# Patient Record
Sex: FEMALE | Race: BLACK OR AFRICAN AMERICAN | NOT HISPANIC OR LATINO | Employment: FULL TIME | URBAN - METROPOLITAN AREA
[De-identification: names, ages, dates, MRNs, and addresses within clinical notes are randomized per-mention and may not be internally consistent; named-entity substitution may affect disease eponyms.]

---
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
## 2025-01-02 NOTE — PROGRESS NOTES
Maternal Fetal Medicine Follow Up    Subjective:     Patient ID: [id number]    Chief Complaint: [facility] follow up w/us       HPI: Renetta Martinez is a 19 y.o. female  at 35w3d gestation with Estimated Date of Delivery: 25  who is here for follow-up consultation by [facility].    Patient has history of  demise.  Per previous records, patient presented with leaking of amniotic fluid that was noted to be meconium.  With continued fetal decelerations, patient had  section done.  Baby unfortunately  shortly thereafter.  The baby was small for gestational age, 5 lb at 38 weeks.  Out of caution, she had APS testing that was negative on 10/10/2024.  She had carrier screening earlier in the pregnancy was noted to be alpha thalassemia carrier.  She declined to have any testing prenatally, including testing of FOB.  She has mild anemia with H/H 10.2/32.7 on 10/23/2024.  She is on oral hematinic therapy.  She had mild fetal growth restriction noted on 2024, and is here for ongoing fetal testing.       Interval history since last [facility] visit: None.. She denies any leaking fluid, vaginal bleeding, contractions, decreased fetal movement. Denies headaches, visual disturbances, or epigastric pain.    Pregnancy complications include:   Patient Active Problem List   Diagnosis    Alpha thalassemia silent carrier    Pregnancy with poor obstetric history    History of prior pregnancy with IUGR     IUGR (intrauterine growth retardation) affecting mother, third trimester, not applicable or unspecified fetus    Anemia during pregnancy in third trimester       No changes to medical, surgical, family, social, or obstetric history.    Medications:  Current Outpatient Medications   Medication Instructions    ascorbic acid (vitamin C) (VITAMIN C) 250 mg, Oral, Every other day    aspirin (ECOTRIN) 81 mg, Oral, Daily    ferrous sulfate (IRON) 325 mg, Oral, Every other day    fluticasone propionate (FLONASE) 50  "mcg/actuation nasal spray by Each Nostril route.    folic acid (FOLVITE) 1 mg, Oral, Daily    PNV no.153/FA/om3/dha/epa/fish (PRENATAL GUMMIES ORAL) Take by mouth.       Review of Systems   12 point review of systems conducted, negative except as stated in the history of present illness. See HPI for details.      Objective:     Visit Vitals  /70 (BP Location: Left arm, Patient Position: Sitting)   Pulse 98   Ht 5' 5" (1.651 m)   Wt 76.7 kg (169 lb)   LMP 2024 (Exact Date)   BMI 28.12 kg/m²        Physical Exam  Vitals and nursing note reviewed.   Constitutional:       Appearance: Normal appearance.   HENT:      Head: Normocephalic and atraumatic.      Nose: Nose normal. No congestion.   Cardiovascular:      Rate and Rhythm: Normal rate.   Pulmonary:      Effort: Pulmonary effort is normal.   Skin:     Findings: No rash.   Neurological:      Mental Status: She is alert and oriented to person, place, and time.   Psychiatric:         Mood and Affect: Mood normal.         Behavior: Behavior normal.         Thought Content: Thought content normal.         Judgment: Judgment normal.         Assessment/Plan:     19 y.o.  female with IUP at 35w3d    History of  demise with mild fetal growth restriction  There was fetal growth restriction with continued but slowing fetal growth with an EFW of 1829 g at the 10% and the AC at the 7% on 24  AFV is normal. BPP is 8/8 today (2025).  Umbilical artery Doppler is normal today (2025).     She was previously offered and declined amniocentesis.  She had cell free DNA that was negative .  She is aware of the need for routine  evaluation.    Complications of growth-restricted neonates include hypoglycemia, hyperbilirubinemia, hypothermia, seizures, sepsis, IVH, RDS, necrotizing enterocolitis, and  asphyxia. Long-term complications include cognitive delay and adult diseases such as cardiovascular disorders and type 2 diabetes. " There is an increased risk (~20%) for recurrence of fetal growth restriction in a future pregnancy.    We will continue to monitor interval fetal growth every 3 weeks and recommend twice weekly fetal testing, including an NST at least once per week.  Reviewed that fetal testing is not a 100% protective against the risk of fetal demise in-utero. The importance of doing fetal kick counts three times a day and resting in a lateral recumbent position and reporting immediately at any time there is any decreased fetal movement even if the same day of testing was also reviewed.    Delivery is not indicated at this time, as risks of  mortality and morbidity with delivery, outweigh risks with continued pregnancy. Likewise, with stable condition, steroids are not recommended, as benefit is reaped only if suspected imminent delivery in few days which, although possible, is very unlikely at this time.     Rediscussed delivery timing.  With current fetal growth restriction and history of  demise with SGA infant, recommend delivery no later than 37 weeks' gestation (37/37 6/7th weeks) as optimal balance between prematurity risks and risks of continued pregnancy. Earlier delivery may be needed for worsening maternal or fetal status.      Alpha thalassemia carrier  She was previously offered and declined amniocentesis.  She also declined to test FOB.  She was aware of the need for routine  evaluation.      Preeclampsia prophylaxis  With her risk factors for preeclampsia, she will continue asa 81 mg daily until delivery. Preeclampsia precautions reviewed.       Mild anemia in pregnancy  Continue oral hematinic therapy.  Consider to recheck intermittent H/H throughout the pregnancy.      Fetal testing is reassuring.  We will continue daily aspirin.  We will get a follow-up CBC and iron studies.  If no improvement and confirmed iron-deficiency, suggest giving IV iron before delivery.  Patient is agreement with  delivery at 37 weeks' gestation as a reasonable balance between prematurity risks and risk of continued pregnancy.  Discussed with Dr. Rowland.    Follow up in about 1 week (around 1/13/2025) for MFM follow-up, Repeat ultrasound, BPP, UAD.     No future appointments.    RAMBO involvement: Patient was evaluated and examined by Dr. Gomez. SYLVIA Khan, helped in pre charting of part of note.    This note was created with the assistance of [employer] voice recognition software. There may be transcription errors as a result of using this technology, however minimal. Effort has been made to ensure accuracy of transcription, but any obvious errors or omissions should be clarified with the author of the document.

## 2025-01-06 ENCOUNTER — PROCEDURE VISIT (OUTPATIENT)
Dept: MATERNAL FETAL MEDICINE | Facility: CLINIC | Age: 20
End: 2025-01-06
Payer: MEDICAID

## 2025-01-06 ENCOUNTER — OFFICE VISIT (OUTPATIENT)
Dept: MATERNAL FETAL MEDICINE | Facility: CLINIC | Age: 20
End: 2025-01-06
Payer: MEDICAID

## 2025-01-06 VITALS
BODY MASS INDEX: 28.16 KG/M2 | DIASTOLIC BLOOD PRESSURE: 70 MMHG | HEIGHT: 65 IN | SYSTOLIC BLOOD PRESSURE: 123 MMHG | HEART RATE: 98 BPM | WEIGHT: 169 LBS

## 2025-01-06 DIAGNOSIS — O99.013 ANEMIA DURING PREGNANCY IN THIRD TRIMESTER: Primary | ICD-10-CM

## 2025-01-06 DIAGNOSIS — O09.299 PREGNANCY WITH POOR OBSTETRIC HISTORY: ICD-10-CM

## 2025-01-06 DIAGNOSIS — O36.5930 IUGR (INTRAUTERINE GROWTH RETARDATION) AFFECTING MOTHER, THIRD TRIMESTER, NOT APPLICABLE OR UNSPECIFIED FETUS: ICD-10-CM

## 2025-01-06 DIAGNOSIS — D56.3 ALPHA THALASSEMIA SILENT CARRIER: ICD-10-CM

## 2025-01-06 DIAGNOSIS — Z87.59 HISTORY OF PRIOR PREGNANCY WITH IUGR NEWBORN: ICD-10-CM

## 2025-01-06 PROCEDURE — 76820 UMBILICAL ARTERY ECHO: CPT | Mod: S$GLB,,, | Performed by: OBSTETRICS & GYNECOLOGY

## 2025-01-06 PROCEDURE — 99214 OFFICE O/P EST MOD 30 MIN: CPT | Mod: TH,S$GLB,, | Performed by: OBSTETRICS & GYNECOLOGY

## 2025-01-06 PROCEDURE — 3078F DIAST BP <80 MM HG: CPT | Mod: CPTII,S$GLB,, | Performed by: OBSTETRICS & GYNECOLOGY

## 2025-01-06 PROCEDURE — 1159F MED LIST DOCD IN RCRD: CPT | Mod: CPTII,S$GLB,, | Performed by: OBSTETRICS & GYNECOLOGY

## 2025-01-06 PROCEDURE — 3008F BODY MASS INDEX DOCD: CPT | Mod: CPTII,S$GLB,, | Performed by: OBSTETRICS & GYNECOLOGY

## 2025-01-06 PROCEDURE — 3074F SYST BP LT 130 MM HG: CPT | Mod: CPTII,S$GLB,, | Performed by: OBSTETRICS & GYNECOLOGY

## 2025-01-06 PROCEDURE — 1160F RVW MEDS BY RX/DR IN RCRD: CPT | Mod: CPTII,S$GLB,, | Performed by: OBSTETRICS & GYNECOLOGY

## 2025-01-06 PROCEDURE — 76819 FETAL BIOPHYS PROFIL W/O NST: CPT | Mod: S$GLB,,, | Performed by: OBSTETRICS & GYNECOLOGY

## 2025-01-06 RX ORDER — FLUTICASONE PROPIONATE 50 MCG
SPRAY, SUSPENSION (ML) NASAL
COMMUNITY
Start: 2024-12-27

## 2025-01-07 DIAGNOSIS — O09.299 PREGNANCY WITH POOR OBSTETRIC HISTORY: ICD-10-CM

## 2025-01-07 DIAGNOSIS — O99.013 ANEMIA DURING PREGNANCY IN THIRD TRIMESTER: Primary | ICD-10-CM

## 2025-01-07 DIAGNOSIS — D56.3 ALPHA THALASSEMIA SILENT CARRIER: ICD-10-CM

## 2025-01-07 DIAGNOSIS — Z36.89 ENCOUNTER FOR ULTRASOUND TO ASSESS FETAL GROWTH: ICD-10-CM

## 2025-01-07 DIAGNOSIS — O36.5930 IUGR (INTRAUTERINE GROWTH RETARDATION) AFFECTING MOTHER, THIRD TRIMESTER, NOT APPLICABLE OR UNSPECIFIED FETUS: ICD-10-CM

## 2025-01-10 ENCOUNTER — ANESTHESIA EVENT (OUTPATIENT)
Dept: OBSTETRICS AND GYNECOLOGY | Facility: HOSPITAL | Age: 20
End: 2025-01-10
Payer: MEDICAID

## 2025-01-10 NOTE — PROGRESS NOTES
Maternal Fetal Medicine Follow Up    Subjective:     Patient ID: [id number]    Chief Complaint: [facility] follow up with US      HPI: Renetta Martinez is a 19 y.o. female  at 36w3d gestation with Estimated Date of Delivery: 25  who is here for follow-up consultation by M.    Patient has history of  demise.  Per previous records, patient presented with leaking of amniotic fluid that was noted to be meconium.  With continued fetal decelerations, patient had  section done.  Baby unfortunately  shortly thereafter.  The baby was small for gestational age, 5 lb at 38 weeks.  Out of caution, she had APS testing that was negative on 10/10/2024.  She had carrier screening earlier in the pregnancy was noted to be alpha thalassemia carrier.  She declined to have any testing prenatally, including testing of FOB.  She has mild anemia with H/H 10.2/32.7 on 10/23/2024.  She is on oral hematinic therapy.  She had mild fetal growth restriction noted on 2024, and is here for ongoing fetal testing.       Interval history since last M visit: None.. She denies any leaking fluid, vaginal bleeding, contractions, decreased fetal movement. Denies headaches, visual disturbances, or epigastric pain.    Pregnancy complications include:   Patient Active Problem List   Diagnosis    Alpha thalassemia silent carrier    Pregnancy with poor obstetric history    History of prior pregnancy with IUGR     IUGR (intrauterine growth retardation) affecting mother, third trimester, not applicable or unspecified fetus    Anemia during pregnancy in third trimester       No changes to medical, surgical, family, social, or obstetric history.    Medications:  Current Outpatient Medications   Medication Instructions    ascorbic acid (vitamin C) (VITAMIN C) 250 mg, Oral, Every other day    aspirin (ECOTRIN) 81 mg, Oral, Daily    ferrous sulfate (IRON) 325 mg, Oral, Every other day    folic acid (FOLVITE) 1 mg, Oral, Daily  "   PNV no.153/FA/om3/dha/epa/fish (PRENATAL GUMMIES ORAL) Take by mouth.       Review of Systems   12 point review of systems conducted, negative except as stated in the history of present illness. See HPI for details.      Objective:     Visit Vitals  /71 (BP Location: Left arm, Patient Position: Sitting)   Pulse 98   Ht 5' 5" (1.651 m)   Wt 77.1 kg (170 lb)   LMP 2024 (Exact Date)   BMI 28.29 kg/m²        Physical Exam  Vitals and nursing note reviewed.   Constitutional:       Appearance: Normal appearance.   HENT:      Head: Normocephalic and atraumatic.      Nose: Nose normal. No congestion.   Cardiovascular:      Rate and Rhythm: Normal rate.   Pulmonary:      Effort: Pulmonary effort is normal.   Skin:     Findings: No rash.   Neurological:      Mental Status: She is alert and oriented to person, place, and time.   Psychiatric:         Mood and Affect: Mood normal.         Behavior: Behavior normal.         Thought Content: Thought content normal.         Judgment: Judgment normal.         Assessment/Plan:     19 y.o.  female with IUP at 36w3d    History of  demise with current mild FGR  There is mild FGR with continued fetal growth with an EFW of 2473 g at the 12% and the AC at the 11% on 25.  AFV is normal. BPP is 8/8 today (2025).  Umbilical artery Doppler is normal today (2025).     She was previously offered and declined amniocentesis.  She had cell free DNA that was negative .  She is aware of the need for routine  evaluation.    Complications of growth-restricted neonates include hypoglycemia, hyperbilirubinemia, hypothermia, seizures, sepsis, IVH, RDS, necrotizing enterocolitis, and  asphyxia. Long-term complications include cognitive delay and adult diseases such as cardiovascular disorders and type 2 diabetes. There is an increased risk (~20%) for recurrence of fetal growth restriction in a future pregnancy.    Continue twice weekly fetal " testing, including an NST at least once per week.  Reviewed that fetal testing is not a 100% protective against the risk of fetal demise in-utero. The importance of doing fetal kick counts three times a day and resting in a lateral recumbent position and reporting immediately at any time there is any decreased fetal movement even if the same day of testing was also reviewed.    Delivery is not indicated at this time, as risks of  mortality and morbidity with delivery, outweigh risks with continued pregnancy. Likewise, with stable condition, steroids are not recommended, as benefit is reaped only if suspected imminent delivery in few days which, although possible, is very unlikely at this time.     With current fetal growth restriction and history of  demise with SGA infant, recommend delivery no later than 37 weeks' gestation (37/37 6/7th weeks) as optimal balance between prematurity risks and risks of continued pregnancy. Earlier delivery may be needed for worsening maternal or fetal status.      Alpha thalassemia carrier  She was previously offered and declined amniocentesis.  She also declined to test FOB.  She was aware of the need for routine  evaluation.      Preeclampsia prophylaxis  With her risk factors for preeclampsia, she will continue asa 81 mg daily until delivery. Preeclampsia precautions reviewed.       Mild anemia in pregnancy  Continue oral hematinic therapy.  Consider to recheck intermittent H/H throughout the pregnancy.    Follow up for None. Keep follow up with primary OB.     Future Appointments   Date Time Provider Department Center   2025  3:00 PM [facility] TEMO Barraza PA-C     This note was created with the assistance of [employer] voice recognition software. There may be transcription errors as a result of using this technology, however minimal. Effort has been made to ensure accuracy of transcription, but any obvious  errors or omissions should be clarified with the author of the document.

## 2025-01-13 ENCOUNTER — OFFICE VISIT (OUTPATIENT)
Dept: MATERNAL FETAL MEDICINE | Facility: CLINIC | Age: 20
End: 2025-01-13
Payer: MEDICAID

## 2025-01-13 ENCOUNTER — PROCEDURE VISIT (OUTPATIENT)
Dept: MATERNAL FETAL MEDICINE | Facility: CLINIC | Age: 20
End: 2025-01-13
Payer: MEDICAID

## 2025-01-13 VITALS
HEIGHT: 65 IN | DIASTOLIC BLOOD PRESSURE: 71 MMHG | WEIGHT: 170 LBS | BODY MASS INDEX: 28.32 KG/M2 | HEART RATE: 98 BPM | SYSTOLIC BLOOD PRESSURE: 130 MMHG

## 2025-01-13 DIAGNOSIS — D56.3 ALPHA THALASSEMIA SILENT CARRIER: ICD-10-CM

## 2025-01-13 DIAGNOSIS — O99.013 ANEMIA DURING PREGNANCY IN THIRD TRIMESTER: ICD-10-CM

## 2025-01-13 DIAGNOSIS — Z87.59 HISTORY OF PRIOR PREGNANCY WITH IUGR NEWBORN: ICD-10-CM

## 2025-01-13 DIAGNOSIS — O36.5930 IUGR (INTRAUTERINE GROWTH RETARDATION) AFFECTING MOTHER, THIRD TRIMESTER, NOT APPLICABLE OR UNSPECIFIED FETUS: ICD-10-CM

## 2025-01-13 DIAGNOSIS — O09.299 PREGNANCY WITH POOR OBSTETRIC HISTORY: ICD-10-CM

## 2025-01-13 DIAGNOSIS — Z36.89 ENCOUNTER FOR ULTRASOUND TO ASSESS FETAL GROWTH: ICD-10-CM

## 2025-01-13 DIAGNOSIS — O99.013 ANEMIA DURING PREGNANCY IN THIRD TRIMESTER: Primary | ICD-10-CM

## 2025-01-13 PROCEDURE — 76820 UMBILICAL ARTERY ECHO: CPT | Mod: S$GLB,,, | Performed by: OBSTETRICS & GYNECOLOGY

## 2025-01-13 PROCEDURE — 1160F RVW MEDS BY RX/DR IN RCRD: CPT | Mod: CPTII,S$GLB,,

## 2025-01-13 PROCEDURE — 3075F SYST BP GE 130 - 139MM HG: CPT | Mod: CPTII,S$GLB,,

## 2025-01-13 PROCEDURE — 99213 OFFICE O/P EST LOW 20 MIN: CPT | Mod: TH,S$GLB,,

## 2025-01-13 PROCEDURE — 1159F MED LIST DOCD IN RCRD: CPT | Mod: CPTII,S$GLB,,

## 2025-01-13 PROCEDURE — 3078F DIAST BP <80 MM HG: CPT | Mod: CPTII,S$GLB,,

## 2025-01-13 PROCEDURE — 76816 OB US FOLLOW-UP PER FETUS: CPT | Mod: S$GLB,,, | Performed by: OBSTETRICS & GYNECOLOGY

## 2025-01-13 PROCEDURE — 76819 FETAL BIOPHYS PROFIL W/O NST: CPT | Mod: S$GLB,,, | Performed by: OBSTETRICS & GYNECOLOGY

## 2025-01-13 PROCEDURE — 3008F BODY MASS INDEX DOCD: CPT | Mod: CPTII,S$GLB,,

## 2025-01-13 RX ORDER — EPHEDRINE SULFATE 50 MG/ML
10 INJECTION, SOLUTION INTRAVENOUS
Status: CANCELLED | OUTPATIENT
Start: 2025-01-13 | End: 2025-01-13

## 2025-01-13 RX ORDER — SODIUM CITRATE AND CITRIC ACID MONOHYDRATE 334; 500 MG/5ML; MG/5ML
30 SOLUTION ORAL ONCE
Status: CANCELLED | OUTPATIENT
Start: 2025-01-13 | End: 2025-01-13

## 2025-01-14 ENCOUNTER — ANESTHESIA (OUTPATIENT)
Dept: OBSTETRICS AND GYNECOLOGY | Facility: HOSPITAL | Age: 20
End: 2025-01-14
Payer: MEDICAID

## 2025-01-14 ENCOUNTER — HOSPITAL ENCOUNTER (INPATIENT)
Facility: HOSPITAL | Age: 20
LOS: 4 days | Discharge: HOME | End: 2025-01-18
Attending: OBSTETRICS & GYNECOLOGY | Admitting: OBSTETRICS & GYNECOLOGY
Payer: MEDICAID

## 2025-01-14 DIAGNOSIS — O09.299 PREGNANCY WITH POOR OBSTETRIC HISTORY: ICD-10-CM

## 2025-01-14 DIAGNOSIS — O99.013 ANEMIA DURING PREGNANCY IN THIRD TRIMESTER: ICD-10-CM

## 2025-01-14 DIAGNOSIS — Z87.59 HISTORY OF PRIOR PREGNANCY WITH IUGR NEWBORN: ICD-10-CM

## 2025-01-14 DIAGNOSIS — Z98.891 S/P C-SECTION: Primary | ICD-10-CM

## 2025-01-14 DIAGNOSIS — D56.3 ALPHA THALASSEMIA SILENT CARRIER: ICD-10-CM

## 2025-01-14 DIAGNOSIS — O36.5930 IUGR (INTRAUTERINE GROWTH RETARDATION) AFFECTING MOTHER, THIRD TRIMESTER, NOT APPLICABLE OR UNSPECIFIED FETUS: ICD-10-CM

## 2025-01-14 PROCEDURE — 63600175 PHARM REV CODE 636 W HCPCS: Performed by: NURSE ANESTHETIST, CERTIFIED REGISTERED

## 2025-01-14 PROCEDURE — 36004725 HC OB OR TIME LEV III - EA ADD 15 MIN: Performed by: OBSTETRICS & GYNECOLOGY

## 2025-01-14 PROCEDURE — 63600175 PHARM REV CODE 636 W HCPCS: Performed by: ANESTHESIOLOGY

## 2025-01-14 PROCEDURE — 36004724 HC OB OR TIME LEV III - 1ST 15 MIN: Performed by: OBSTETRICS & GYNECOLOGY

## 2025-01-14 PROCEDURE — 37000008 HC ANESTHESIA 1ST 15 MINUTES: Performed by: OBSTETRICS & GYNECOLOGY

## 2025-01-14 PROCEDURE — 99900035 HC TECH TIME PER 15 MIN (STAT)

## 2025-01-14 PROCEDURE — D9220A PRA ANESTHESIA: Mod: QY,ANES,, | Performed by: ANESTHESIOLOGY

## 2025-01-14 PROCEDURE — 37000009 HC ANESTHESIA EA ADD 15 MINS: Performed by: OBSTETRICS & GYNECOLOGY

## 2025-01-14 PROCEDURE — 63600175 PHARM REV CODE 636 W HCPCS: Performed by: OBSTETRICS & GYNECOLOGY

## 2025-01-14 PROCEDURE — 25000003 PHARM REV CODE 250: Performed by: OBSTETRICS & GYNECOLOGY

## 2025-01-14 PROCEDURE — 11000001 HC ACUTE MED/SURG PRIVATE ROOM

## 2025-01-14 PROCEDURE — 27000492 HC SLEEVE, SCD T/L

## 2025-01-14 PROCEDURE — 51702 INSERT TEMP BLADDER CATH: CPT

## 2025-01-14 PROCEDURE — 71000033 HC RECOVERY, INTIAL HOUR: Performed by: OBSTETRICS & GYNECOLOGY

## 2025-01-14 PROCEDURE — D9220A PRA ANESTHESIA: Mod: QX,CRNA,, | Performed by: NURSE ANESTHETIST, CERTIFIED REGISTERED

## 2025-01-14 RX ORDER — OXYTOCIN 10 [USP'U]/ML
10 INJECTION, SOLUTION INTRAMUSCULAR; INTRAVENOUS ONCE AS NEEDED
Status: DISCONTINUED | OUTPATIENT
Start: 2025-01-14 | End: 2025-01-18 | Stop reason: HOSPADM

## 2025-01-14 RX ORDER — ACETAMINOPHEN 325 MG/1
650 TABLET ORAL EVERY 4 HOURS PRN
Status: DISCONTINUED | OUTPATIENT
Start: 2025-01-14 | End: 2025-01-18 | Stop reason: HOSPADM

## 2025-01-14 RX ORDER — ACETAMINOPHEN 325 MG/1
650 TABLET ORAL EVERY 6 HOURS
Status: ACTIVE | OUTPATIENT
Start: 2025-01-14 | End: 2025-01-15

## 2025-01-14 RX ORDER — ONDANSETRON HYDROCHLORIDE 2 MG/ML
4 INJECTION, SOLUTION INTRAVENOUS EVERY 6 HOURS PRN
Status: ACTIVE | OUTPATIENT
Start: 2025-01-14 | End: 2025-01-15

## 2025-01-14 RX ORDER — OXYTOCIN/0.9 % SODIUM CHLORIDE 15/250 ML
95 PLASTIC BAG, INJECTION (ML) INTRAVENOUS CONTINUOUS PRN
Status: DISCONTINUED | OUTPATIENT
Start: 2025-01-14 | End: 2025-01-18 | Stop reason: HOSPADM

## 2025-01-14 RX ORDER — CEFAZOLIN SODIUM 1 G/3ML
2 INJECTION, POWDER, FOR SOLUTION INTRAMUSCULAR; INTRAVENOUS
Status: DISCONTINUED | OUTPATIENT
Start: 2025-01-14 | End: 2025-01-18 | Stop reason: HOSPADM

## 2025-01-14 RX ORDER — OXYTOCIN/0.9 % SODIUM CHLORIDE 15/250 ML
95 PLASTIC BAG, INJECTION (ML) INTRAVENOUS ONCE AS NEEDED
Status: COMPLETED | OUTPATIENT
Start: 2025-01-14 | End: 2025-01-14

## 2025-01-14 RX ORDER — BISACODYL 10 MG/1
10 SUPPOSITORY RECTAL ONCE AS NEEDED
Status: DISCONTINUED | OUTPATIENT
Start: 2025-01-14 | End: 2025-01-18 | Stop reason: HOSPADM

## 2025-01-14 RX ORDER — SODIUM CITRATE AND CITRIC ACID MONOHYDRATE 334; 500 MG/5ML; MG/5ML
30 SOLUTION ORAL
Status: DISCONTINUED | OUTPATIENT
Start: 2025-01-14 | End: 2025-01-18 | Stop reason: HOSPADM

## 2025-01-14 RX ORDER — MUPIROCIN 20 MG/G
OINTMENT TOPICAL
Status: CANCELLED | OUTPATIENT
Start: 2025-01-14

## 2025-01-14 RX ORDER — CEFAZOLIN SODIUM 1 G/3ML
INJECTION, POWDER, FOR SOLUTION INTRAMUSCULAR; INTRAVENOUS
Status: DISCONTINUED | OUTPATIENT
Start: 2025-01-14 | End: 2025-01-14

## 2025-01-14 RX ORDER — ONDANSETRON HYDROCHLORIDE 2 MG/ML
4 INJECTION, SOLUTION INTRAVENOUS EVERY 6 HOURS PRN
Status: DISCONTINUED | OUTPATIENT
Start: 2025-01-14 | End: 2025-01-18 | Stop reason: HOSPADM

## 2025-01-14 RX ORDER — DIPHENHYDRAMINE HCL 25 MG
25 CAPSULE ORAL EVERY 4 HOURS PRN
Status: DISCONTINUED | OUTPATIENT
Start: 2025-01-14 | End: 2025-01-18 | Stop reason: HOSPADM

## 2025-01-14 RX ORDER — CARBOPROST TROMETHAMINE 250 UG/ML
250 INJECTION, SOLUTION INTRAMUSCULAR
Status: DISCONTINUED | OUTPATIENT
Start: 2025-01-14 | End: 2025-01-18 | Stop reason: HOSPADM

## 2025-01-14 RX ORDER — IBUPROFEN 600 MG/1
600 TABLET ORAL EVERY 6 HOURS
Status: DISCONTINUED | OUTPATIENT
Start: 2025-01-15 | End: 2025-01-18 | Stop reason: HOSPADM

## 2025-01-14 RX ORDER — HYDROMORPHONE HYDROCHLORIDE 2 MG/ML
1 INJECTION, SOLUTION INTRAMUSCULAR; INTRAVENOUS; SUBCUTANEOUS EVERY 4 HOURS PRN
Status: DISCONTINUED | OUTPATIENT
Start: 2025-01-14 | End: 2025-01-18 | Stop reason: HOSPADM

## 2025-01-14 RX ORDER — FENTANYL CITRATE 50 UG/ML
INJECTION, SOLUTION INTRAMUSCULAR; INTRAVENOUS
Status: DISCONTINUED | OUTPATIENT
Start: 2025-01-14 | End: 2025-01-14

## 2025-01-14 RX ORDER — DOCUSATE SODIUM 100 MG/1
200 CAPSULE, LIQUID FILLED ORAL 2 TIMES DAILY
Status: DISCONTINUED | OUTPATIENT
Start: 2025-01-14 | End: 2025-01-18 | Stop reason: HOSPADM

## 2025-01-14 RX ORDER — ONDANSETRON 4 MG/1
8 TABLET, ORALLY DISINTEGRATING ORAL EVERY 8 HOURS PRN
Status: DISCONTINUED | OUTPATIENT
Start: 2025-01-14 | End: 2025-01-18 | Stop reason: HOSPADM

## 2025-01-14 RX ORDER — ADHESIVE BANDAGE
30 BANDAGE TOPICAL 2 TIMES DAILY PRN
Status: DISCONTINUED | OUTPATIENT
Start: 2025-01-15 | End: 2025-01-18 | Stop reason: HOSPADM

## 2025-01-14 RX ORDER — MORPHINE SULFATE 4 MG/ML
2 INJECTION, SOLUTION INTRAMUSCULAR; INTRAVENOUS EVERY 5 MIN PRN
Status: DISCONTINUED | OUTPATIENT
Start: 2025-01-15 | End: 2025-01-18 | Stop reason: HOSPADM

## 2025-01-14 RX ORDER — KETOROLAC TROMETHAMINE 30 MG/ML
30 INJECTION, SOLUTION INTRAMUSCULAR; INTRAVENOUS EVERY 6 HOURS
Status: COMPLETED | OUTPATIENT
Start: 2025-01-14 | End: 2025-01-15

## 2025-01-14 RX ORDER — OXYTOCIN-SODIUM CHLORIDE 0.9% IV SOLN 30 UNIT/500ML 30-0.9/5 UT/ML-%
10 SOLUTION INTRAVENOUS ONCE AS NEEDED
Status: CANCELLED | OUTPATIENT
Start: 2025-01-14 | End: 2036-06-12

## 2025-01-14 RX ORDER — MORPHINE SULFATE 0.5 MG/ML
INJECTION, SOLUTION EPIDURAL; INTRATHECAL; INTRAVENOUS
Status: DISCONTINUED | OUTPATIENT
Start: 2025-01-14 | End: 2025-01-14

## 2025-01-14 RX ORDER — MISOPROSTOL 100 UG/1
800 TABLET ORAL
Status: DISCONTINUED | OUTPATIENT
Start: 2025-01-14 | End: 2025-01-18 | Stop reason: HOSPADM

## 2025-01-14 RX ORDER — PHENYLEPHRINE HYDROCHLORIDE 10 MG/ML
INJECTION INTRAVENOUS
Status: DISCONTINUED | OUTPATIENT
Start: 2025-01-14 | End: 2025-01-14

## 2025-01-14 RX ORDER — ONDANSETRON HYDROCHLORIDE 2 MG/ML
INJECTION, SOLUTION INTRAVENOUS
Status: DISCONTINUED | OUTPATIENT
Start: 2025-01-14 | End: 2025-01-14

## 2025-01-14 RX ORDER — OXYCODONE AND ACETAMINOPHEN 5; 325 MG/1; MG/1
1 TABLET ORAL EVERY 6 HOURS PRN
Status: DISCONTINUED | OUTPATIENT
Start: 2025-01-14 | End: 2025-01-18 | Stop reason: HOSPADM

## 2025-01-14 RX ORDER — DEXTROSE, SODIUM CHLORIDE, SODIUM LACTATE, POTASSIUM CHLORIDE, AND CALCIUM CHLORIDE 5; .6; .31; .03; .02 G/100ML; G/100ML; G/100ML; G/100ML; G/100ML
INJECTION, SOLUTION INTRAVENOUS CONTINUOUS
Status: DISCONTINUED | OUTPATIENT
Start: 2025-01-14 | End: 2025-01-18 | Stop reason: HOSPADM

## 2025-01-14 RX ORDER — PROCHLORPERAZINE EDISYLATE 5 MG/ML
5 INJECTION INTRAMUSCULAR; INTRAVENOUS EVERY 6 HOURS PRN
Status: DISCONTINUED | OUTPATIENT
Start: 2025-01-14 | End: 2025-01-18 | Stop reason: HOSPADM

## 2025-01-14 RX ORDER — SODIUM CHLORIDE, SODIUM LACTATE, POTASSIUM CHLORIDE, CALCIUM CHLORIDE 600; 310; 30; 20 MG/100ML; MG/100ML; MG/100ML; MG/100ML
INJECTION, SOLUTION INTRAVENOUS CONTINUOUS
Status: DISCONTINUED | OUTPATIENT
Start: 2025-01-14 | End: 2025-01-18 | Stop reason: HOSPADM

## 2025-01-14 RX ORDER — OXYCODONE HYDROCHLORIDE 5 MG/1
10 TABLET ORAL EVERY 4 HOURS PRN
Status: DISCONTINUED | OUTPATIENT
Start: 2025-01-14 | End: 2025-01-14

## 2025-01-14 RX ORDER — OXYTOCIN/0.9 % SODIUM CHLORIDE 15/250 ML
PLASTIC BAG, INJECTION (ML) INTRAVENOUS CONTINUOUS PRN
Status: DISCONTINUED | OUTPATIENT
Start: 2025-01-14 | End: 2025-01-14

## 2025-01-14 RX ORDER — SIMETHICONE 80 MG
1 TABLET,CHEWABLE ORAL EVERY 4 HOURS PRN
Status: DISCONTINUED | OUTPATIENT
Start: 2025-01-14 | End: 2025-01-18 | Stop reason: HOSPADM

## 2025-01-14 RX ORDER — OXYCODONE AND ACETAMINOPHEN 10; 325 MG/1; MG/1
1 TABLET ORAL EVERY 6 HOURS PRN
Status: DISCONTINUED | OUTPATIENT
Start: 2025-01-14 | End: 2025-01-18 | Stop reason: HOSPADM

## 2025-01-14 RX ORDER — BUPIVACAINE HYDROCHLORIDE 7.5 MG/ML
INJECTION, SOLUTION EPIDURAL; RETROBULBAR
Status: COMPLETED | OUTPATIENT
Start: 2025-01-14 | End: 2025-01-14

## 2025-01-14 RX ORDER — KETOROLAC TROMETHAMINE 30 MG/ML
INJECTION, SOLUTION INTRAMUSCULAR; INTRAVENOUS
Status: DISCONTINUED | OUTPATIENT
Start: 2025-01-14 | End: 2025-01-14

## 2025-01-14 RX ORDER — OXYCODONE HYDROCHLORIDE 5 MG/1
5 TABLET ORAL EVERY 4 HOURS PRN
Status: DISCONTINUED | OUTPATIENT
Start: 2025-01-14 | End: 2025-01-14

## 2025-01-14 RX ORDER — DIPHENOXYLATE HYDROCHLORIDE AND ATROPINE SULFATE 2.5; .025 MG/1; MG/1
2 TABLET ORAL EVERY 6 HOURS PRN
Status: DISCONTINUED | OUTPATIENT
Start: 2025-01-14 | End: 2025-01-18 | Stop reason: HOSPADM

## 2025-01-14 RX ORDER — DIPHENHYDRAMINE HYDROCHLORIDE 50 MG/ML
25 INJECTION INTRAMUSCULAR; INTRAVENOUS EVERY 4 HOURS PRN
Status: DISCONTINUED | OUTPATIENT
Start: 2025-01-14 | End: 2025-01-18 | Stop reason: HOSPADM

## 2025-01-14 RX ORDER — ACETAMINOPHEN 10 MG/ML
INJECTION, SOLUTION INTRAVENOUS
Status: DISCONTINUED | OUTPATIENT
Start: 2025-01-14 | End: 2025-01-14

## 2025-01-14 RX ORDER — METHYLERGONOVINE MALEATE 0.2 MG/ML
200 INJECTION INTRAVENOUS
Status: DISCONTINUED | OUTPATIENT
Start: 2025-01-14 | End: 2025-01-18 | Stop reason: HOSPADM

## 2025-01-14 RX ORDER — METHYLERGONOVINE MALEATE 0.2 MG/ML
200 INJECTION INTRAVENOUS ONCE AS NEEDED
Status: DISCONTINUED | OUTPATIENT
Start: 2025-01-14 | End: 2025-01-18 | Stop reason: HOSPADM

## 2025-01-14 RX ORDER — FAMOTIDINE 10 MG/ML
20 INJECTION INTRAVENOUS
Status: DISCONTINUED | OUTPATIENT
Start: 2025-01-14 | End: 2025-01-18 | Stop reason: HOSPADM

## 2025-01-14 RX ADMIN — MORPHINE SULFATE 0.1 MG: 0.5 INJECTION, SOLUTION EPIDURAL; INTRATHECAL; INTRAVENOUS at 12:01

## 2025-01-14 RX ADMIN — DIPHENHYDRAMINE HYDROCHLORIDE 25 MG: 50 INJECTION INTRAMUSCULAR; INTRAVENOUS at 03:01

## 2025-01-14 RX ADMIN — HYDROMORPHONE HYDROCHLORIDE 1 MG: 2 INJECTION INTRAMUSCULAR; INTRAVENOUS; SUBCUTANEOUS at 03:01

## 2025-01-14 RX ADMIN — SODIUM CHLORIDE, POTASSIUM CHLORIDE, SODIUM LACTATE AND CALCIUM CHLORIDE 1000 ML: 600; 310; 30; 20 INJECTION, SOLUTION INTRAVENOUS at 10:01

## 2025-01-14 RX ADMIN — BUPIVACAINE HYDROCHLORIDE 1.5 ML: 7.5 INJECTION, SOLUTION EPIDURAL; RETROBULBAR at 12:01

## 2025-01-14 RX ADMIN — OXYTOCIN 95 MILLI-UNITS/MIN: 10 INJECTION, SOLUTION INTRAMUSCULAR; INTRAVENOUS at 01:01

## 2025-01-14 RX ADMIN — ONDANSETRON 8 MG: 2 INJECTION INTRAMUSCULAR; INTRAVENOUS at 12:01

## 2025-01-14 RX ADMIN — SODIUM CHLORIDE, POTASSIUM CHLORIDE, SODIUM LACTATE AND CALCIUM CHLORIDE: 600; 310; 30; 20 INJECTION, SOLUTION INTRAVENOUS at 10:01

## 2025-01-14 RX ADMIN — SODIUM CHLORIDE, POTASSIUM CHLORIDE, SODIUM LACTATE AND CALCIUM CHLORIDE: 600; 310; 30; 20 INJECTION, SOLUTION INTRAVENOUS at 12:01

## 2025-01-14 RX ADMIN — CEFAZOLIN 2 G: 330 INJECTION, POWDER, FOR SOLUTION INTRAMUSCULAR; INTRAVENOUS at 12:01

## 2025-01-14 RX ADMIN — KETOROLAC TROMETHAMINE 30 MG: 30 INJECTION, SOLUTION INTRAMUSCULAR; INTRAVENOUS at 12:01

## 2025-01-14 RX ADMIN — KETOROLAC TROMETHAMINE 30 MG: 30 INJECTION, SOLUTION INTRAMUSCULAR at 05:01

## 2025-01-14 RX ADMIN — FENTANYL CITRATE 10 MCG: 50 INJECTION, SOLUTION INTRAMUSCULAR; INTRAVENOUS at 12:01

## 2025-01-14 RX ADMIN — Medication 500 ML/HR: at 12:01

## 2025-01-14 RX ADMIN — PHENYLEPHRINE HYDROCHLORIDE 100 MCG: 10 INJECTION INTRAVENOUS at 12:01

## 2025-01-14 RX ADMIN — SODIUM CITRATE AND CITRIC ACID MONOHYDRATE 30 ML: 500; 334 SOLUTION ORAL at 10:01

## 2025-01-14 RX ADMIN — ACETAMINOPHEN 1000 MG: 10 INJECTION, SOLUTION INTRAVENOUS at 12:01

## 2025-01-14 RX ADMIN — HYDROMORPHONE HYDROCHLORIDE 1 MG: 2 INJECTION INTRAMUSCULAR; INTRAVENOUS; SUBCUTANEOUS at 07:01

## 2025-01-14 NOTE — H&P
Patient ID: Renetta Martinez is a 19 y.o. female.    Chief Complaint: Scheduled       HPI:  HPI hx of cs term pregnancy 39 wks   For repeat    Review of Systems   Nl 12 point review  Current Facility-Administered Medications   Medication Dose Route Frequency Provider Last Rate Last Admin    carboprost injection 250 mcg  250 mcg Intramuscular On Call Procedure Rios Rowland Jr., MD        ceFAZolin injection 2 g  2 g Intravenous On Call Procedure Rios Rowland Jr., MD        famotidine (PF) injection 20 mg  20 mg Intravenous On Call Procedure Rios Rowland Jr., MD        lactated ringers infusion   Intravenous Continuous Rios Rowland Jr.,  mL/hr at 25 1015 New Bag at 25 1015    methylergonovine injection 200 mcg  200 mcg Intramuscular On Call Procedure Rios Rowland Jr., MD        miSOPROStoL tablet 800 mcg  800 mcg Rectal On Call Procedure Rios Rowland Jr., MD        sodium citrate-citric acid 500-334 mg/5 ml solution 30 mL  30 mL Oral On Call Procedure Rios Rowland Jr., MD   30 mL at 25 1048       Review of patient's allergies indicates:  No Known Allergies    Past Medical History:   Diagnosis Date    Folliculitis 2022       Past Surgical History:   Procedure Laterality Date     SECTION         Social History     Socioeconomic History    Marital status: Single   Tobacco Use    Smoking status: Former     Types: Vaping with nicotine     Start date:      Quit date:      Years since quittin.0     Passive exposure: Never    Smokeless tobacco: Never   Substance and Sexual Activity    Alcohol use: Not Currently     Comment: occasion    Drug use: Never    Sexual activity: Yes     Partners: Male     Social Drivers of Health     Financial Resource Strain: Low Risk  (2025)    Overall Financial Resource Strain (CARDIA)     Difficulty of Paying Living Expenses: Not hard at all   Food Insecurity: No Food Insecurity (2025)    Hunger Vital  Sign     Worried About Running Out of Food in the Last Year: Never true     Ran Out of Food in the Last Year: Never true   Transportation Needs: No Transportation Needs (1/14/2025)    TRANSPORTATION NEEDS     Transportation : No   Stress: No Stress Concern Present (1/14/2025)    [employer] - Occupational Stress Questionnaire     Feeling of Stress : Not at all   Housing Stability: Low Risk  (1/14/2025)    Housing Stability Vital Sign     Unable to Pay for Housing in the Last Year: No     Homeless in the Last Year: No       Vitals:    01/14/25 1040   BP: 119/63   Pulse: 87   Resp: 16   Temp: 98.7 °F (37.1 °C)       Physical Exam  S1s2 no murmers  Abd soft nt gravid  Ext trace edema  Cervix-deferred  Neuro-intact    Assessment & Plan:  Cs today

## 2025-01-14 NOTE — PLAN OF CARE
Problem: Adult Inpatient Plan of Care  Goal: Plan of Care Review  Outcome: Progressing  Goal: Patient-Specific Goal (Individualized)  Outcome: Progressing  Goal: Absence of Hospital-Acquired Illness or Injury  Outcome: Progressing  Goal: Optimal Comfort and Wellbeing  Outcome: Progressing  Goal: Readiness for Transition of Care  Outcome: Progressing     Problem:  Delivery  Goal: Bleeding is Controlled  Outcome: Progressing  Goal: Stable Fetal Wellbeing  Outcome: Progressing  Goal: Absence of Infection Signs and Symptoms  Outcome: Progressing  Goal: Effective Oxygenation and Ventilation  Outcome: Progressing     Problem: Infection  Goal: Absence of Infection Signs and Symptoms  Outcome: Progressing     Problem:  Fall Injury Risk  Goal: Absence of Fall, Infant Drop and Related Injury  Outcome: Progressing     Problem: Wound  Goal: Optimal Coping  Outcome: Progressing  Goal: Optimal Functional Ability  Outcome: Progressing  Goal: Absence of Infection Signs and Symptoms  Outcome: Progressing  Goal: Improved Oral Intake  Outcome: Progressing  Goal: Optimal Pain Control and Function  Outcome: Progressing  Goal: Skin Health and Integrity  Outcome: Progressing  Goal: Optimal Wound Healing  Outcome: Progressing     Problem: Postpartum ( Delivery)  Goal: Successful Parent Role Transition  Outcome: Progressing  Goal: Hemostasis  Outcome: Progressing  Goal: Effective Bowel Elimination  Outcome: Progressing  Goal: Fluid and Electrolyte Balance  Outcome: Progressing  Goal: Absence of Infection Signs and Symptoms  Outcome: Progressing  Goal: Anesthesia/Sedation Recovery  Outcome: Progressing  Goal: Optimal Pain Control and Function  Outcome: Progressing  Goal: Nausea and Vomiting Relief  Outcome: Progressing  Goal: Effective Urinary Elimination  Outcome: Progressing  Goal: Effective Oxygenation and Ventilation  Outcome: Progressing

## 2025-01-14 NOTE — ANESTHESIA PREPROCEDURE EVALUATION
2025  Renetta Martinez is a 19 y.o., female.      Renetta Martinez    Pre-op Diagnosis: Fetal growth restriction antepartum [O36.5990]  Previous  section [Z98.891]    Procedure(s):   SECTION     Review of patient's allergies indicates:  No Known Allergies    Current Outpatient Medications   Medication Instructions    ascorbic acid (vitamin C) (VITAMIN C) 250 mg, Oral, Every other day    aspirin (ECOTRIN) 81 mg, Oral, Daily    ferrous sulfate (IRON) 325 mg, Oral, Every other day    folic acid (FOLVITE) 1 mg, Oral, Daily    PNV no.153/FA/om3/dha/epa/fish (PRENATAL GUMMIES ORAL) Take by mouth.       MO  DELIVERY ONLY [13106] ( SECTION)    Past Medical History:   Diagnosis Date    Folliculitis 2022       Past Surgical History:   Procedure Laterality Date     SECTION        Recent Labs   Lab 25  1453   WBC 6.60   RBC 4.18*   HGB 10.0*   HCT 31.9*   MCV 76.3*   MCH 23.9*   MCHC 31.3*   RDW 15.6          Pre-op Assessment    I have reviewed the Patient Summary Reports.    I have reviewed the NPO Status.   I have reviewed the Medications.     Review of Systems  Anesthesia Hx:  No problems with previous Anesthesia             Denies Family Hx of Anesthesia complications.    Denies Personal Hx of Anesthesia complications.                    Social:  Non-Smoker       Cardiovascular:  Exercise tolerance: good          Denies Angina.   Denies Orthopnea.  Denies PND.    Denies ARCINIEGA.      Functional Capacity good / => 4 METS                         Musculoskeletal:  Musculoskeletal Normal                Neurological:  Denies TIA.  Denies CVA.                                    Psych:  Psychiatric Normal                    Physical Exam  General: Well nourished, Alert and Oriented    Airway:  Mallampati: III   Mouth Opening: Normal  TM Distance: Normal  Tongue:  Normal  Neck ROM: Normal ROM    Dental:  Intact    Chest/Lungs:  Clear to auscultation    Heart:  Rate: Normal  Rhythm: Regular Rhythm        Anesthesia Plan  Type of Anesthesia, risks & benefits discussed:    Anesthesia Type: Spinal  Intra-op Monitoring Plan: Standard ASA Monitors  Post Op Pain Control Plan: multimodal analgesia  Induction:  IV  Airway Plan: Direct  Informed Consent: Informed consent signed with the Patient and all parties understand the risks and agree with anesthesia plan.  All questions answered. Patient consented to blood products? Yes  ASA Score: 2  Day of Surgery Review of History & Physical: H&P Update referred to the surgeon/provider.  Anesthesia Plan Notes: Risks post spinal headache, backache, high spinal, sensory / motor neuropathy, & failed spinal with possible General Anesthesia (GETA) explained & patient accepts     Ready For Surgery From Anesthesia Perspective.     .

## 2025-01-14 NOTE — ANESTHESIA PROCEDURE NOTES
Spinal    Diagnosis: Previous C Section  Patient location during procedure: OR  Start time: 1/14/2025 12:21 PM  Timeout: 1/14/2025 12:21 PM  End time: 1/14/2025 12:25 PM    Staffing  Authorizing Provider: Fabian Hicks MD  Performing Provider: Fabian Hicks MD    Staffing  Performed by: Fabian Hicks MD  Authorized by: Fabian Hicks MD    Preanesthetic Checklist  Completed: patient identified, IV checked, site marked, risks and benefits discussed, surgical consent, monitors and equipment checked, pre-op evaluation and timeout performed  Spinal Block  Patient position: sitting  Prep: ChloraPrep  Patient monitoring: heart rate, cardiac monitor, continuous pulse ox and frequent blood pressure checks  Approach: midline  Location: L3-4  Injection technique: single shot  CSF Fluid: clear free-flowing CSF  Needle  Needle type: Anabella   Needle gauge: 25 G  Needle length: 3.5 in  Additional Documentation: incremental injection, negative aspiration for heme and no paresthesia on injection  Needle localization: anatomical landmarks  Assessment  Sensory level: T5   Dermatomal levels determined by alcohol wipe  Ease of block: easy  Patient's tolerance of the procedure: comfortable throughout block and no complaints  Additional Notes  Sitting position, usual prep & drape, local to skin & subq 25 ga 1% xylocaine (3 ml) at L4-5, can't pass x 3  local to skin & subq 25 ga 1% xylocaine (2 ml) at L3-4  Introducer in, 25 ga Pencan needle, clear csf on 2nd pass, no paresthesia, dosed, 1.5 ml 0.75% Bupivacaine (in 8.25% dextrose), 10 mcg Fentanyl & 100 mcg Duramorph intrathecal, needle out, to supine with CARMINA   Medications:    Medications: bupivacaine (pf) (MARCAINE) injection 0.75% - Intraspinal   1.5 mL - 1/14/2025 12:25:00 PM

## 2025-01-14 NOTE — TRANSFER OF CARE
"Anesthesia Transfer of Care Note    Patient: Renetta Martinez    Procedure(s) Performed: Procedure(s) (LRB):   SECTION (N/A)    Patient location: Labor and Delivery    Anesthesia Type: spinal    Transport from OR: Transported from OR on room air with adequate spontaneous ventilation    Post pain: adequate analgesia    Post assessment: no apparent anesthetic complications    Post vital signs: stable    Level of consciousness: awake, alert and oriented    Nausea/Vomiting: no nausea/vomiting    Complications: none    Transfer of care protocol was followed      Last vitals: Visit Vitals  /63 (BP Location: Right arm, Patient Position: Lying)   Pulse 87   Temp 37.1 °C (98.7 °F) (Oral)   Resp 16   Ht 5' 5" (1.651 m)   Wt 77.1 kg (170 lb)   LMP 2024 (Exact Date)   SpO2 98%   Breastfeeding No   BMI 28.29 kg/m²     "

## 2025-01-15 LAB
BASOPHILS # BLD AUTO: 0.03 X10(3)/MCL
BASOPHILS NFR BLD AUTO: 0.3 %
EOSINOPHIL # BLD AUTO: 0.07 X10(3)/MCL (ref 0–0.9)
EOSINOPHIL NFR BLD AUTO: 0.7 %
ERYTHROCYTE [DISTWIDTH] IN BLOOD BY AUTOMATED COUNT: 15.1 % (ref 11.5–17)
HCT VFR BLD AUTO: 29.6 % (ref 37–47)
HGB BLD-MCNC: 9.5 G/DL (ref 12–16)
IMM GRANULOCYTES # BLD AUTO: 0.06 X10(3)/MCL (ref 0–0.04)
IMM GRANULOCYTES NFR BLD AUTO: 0.6 %
LYMPHOCYTES # BLD AUTO: 2.12 X10(3)/MCL (ref 0.6–4.6)
LYMPHOCYTES NFR BLD AUTO: 22.6 %
MCH RBC QN AUTO: 24.1 PG (ref 27–31)
MCHC RBC AUTO-ENTMCNC: 32.1 G/DL (ref 33–36)
MCV RBC AUTO: 74.9 FL (ref 80–94)
MONOCYTES # BLD AUTO: 0.71 X10(3)/MCL (ref 0.1–1.3)
MONOCYTES NFR BLD AUTO: 7.6 %
NEUTROPHILS # BLD AUTO: 6.38 X10(3)/MCL (ref 2.1–9.2)
NEUTROPHILS NFR BLD AUTO: 68.2 %
NRBC BLD AUTO-RTO: 0 %
PLATELET # BLD AUTO: 165 X10(3)/MCL (ref 130–400)
PLATELETS.RETICULATED NFR BLD AUTO: 7.9 % (ref 0.9–11.2)
PMV BLD AUTO: 13.1 FL (ref 7.4–10.4)
RBC # BLD AUTO: 3.95 X10(6)/MCL (ref 4.2–5.4)
WBC # BLD AUTO: 9.37 X10(3)/MCL (ref 4.5–11.5)

## 2025-01-15 PROCEDURE — 25000003 PHARM REV CODE 250: Performed by: OBSTETRICS & GYNECOLOGY

## 2025-01-15 PROCEDURE — 85025 COMPLETE CBC W/AUTO DIFF WBC: CPT | Performed by: OBSTETRICS & GYNECOLOGY

## 2025-01-15 PROCEDURE — 11000001 HC ACUTE MED/SURG PRIVATE ROOM

## 2025-01-15 PROCEDURE — 63600175 PHARM REV CODE 636 W HCPCS: Performed by: OBSTETRICS & GYNECOLOGY

## 2025-01-15 PROCEDURE — 36415 COLL VENOUS BLD VENIPUNCTURE: CPT | Performed by: OBSTETRICS & GYNECOLOGY

## 2025-01-15 RX ORDER — OXYCODONE AND ACETAMINOPHEN 10; 325 MG/1; MG/1
1 TABLET ORAL EVERY 6 HOURS PRN
Qty: 28 TABLET | Refills: 0 | Status: SHIPPED | OUTPATIENT
Start: 2025-01-15

## 2025-01-15 RX ADMIN — KETOROLAC TROMETHAMINE 30 MG: 30 INJECTION, SOLUTION INTRAMUSCULAR at 06:01

## 2025-01-15 RX ADMIN — OXYCODONE AND ACETAMINOPHEN 1 TABLET: 10; 325 TABLET ORAL at 08:01

## 2025-01-15 RX ADMIN — OXYCODONE AND ACETAMINOPHEN 1 TABLET: 10; 325 TABLET ORAL at 07:01

## 2025-01-15 RX ADMIN — IBUPROFEN 600 MG: 600 TABLET, FILM COATED ORAL at 06:01

## 2025-01-15 RX ADMIN — KETOROLAC TROMETHAMINE 30 MG: 30 INJECTION, SOLUTION INTRAMUSCULAR at 12:01

## 2025-01-15 RX ADMIN — DOCUSATE SODIUM 200 MG: 100 CAPSULE, LIQUID FILLED ORAL at 07:01

## 2025-01-15 RX ADMIN — OXYCODONE AND ACETAMINOPHEN 1 TABLET: 10; 325 TABLET ORAL at 01:01

## 2025-01-15 RX ADMIN — DOCUSATE SODIUM 200 MG: 100 CAPSULE, LIQUID FILLED ORAL at 08:01

## 2025-01-15 RX ADMIN — IBUPROFEN 600 MG: 600 TABLET, FILM COATED ORAL at 11:01

## 2025-01-15 NOTE — PLAN OF CARE
Problem: Adult Inpatient Plan of Care  Goal: Plan of Care Review  2025 by Susie Burnett RN  Outcome: Progressing  2025 by Susie Burnett RN  Outcome: Progressing  Goal: Patient-Specific Goal (Individualized)  2025 by Susie Burnett RN  Outcome: Progressing  2025 by Susie Burnett RN  Outcome: Progressing  Goal: Absence of Hospital-Acquired Illness or Injury  2025 by Susie Burnett RN  Outcome: Progressing  2025 by Susie Burnett RN  Outcome: Progressing  Goal: Optimal Comfort and Wellbeing  2025 by Susie Burnett RN  Outcome: Progressing  2025 by Susie Burnett RN  Outcome: Progressing  Goal: Readiness for Transition of Care  2025 by Susie Burnett RN  Outcome: Progressing  2025 by Susie Burnett RN  Outcome: Progressing     Problem:  Delivery  Goal: Bleeding is Controlled  2025 by Susie Burnett RN  Outcome: Progressing  2025 by Susie Burnett RN  Outcome: Progressing  Goal: Stable Fetal Wellbeing  2025 by Susie Burnett RN  Outcome: Progressing  2025 by Susie Burnett RN  Outcome: Progressing  Goal: Absence of Infection Signs and Symptoms  2025 by Susie Burnett RN  Outcome: Progressing  2025 by Susie Burnett RN  Outcome: Progressing  Goal: Effective Oxygenation and Ventilation  2025 by Susie Burnett RN  Outcome: Progressing  2025 by Susie Burnett RN  Outcome: Progressing     Problem: Infection  Goal: Absence of Infection Signs and Symptoms  2025 by Susie Burnett RN  Outcome: Progressing  2025 by Susie Burnett RN  Outcome: Progressing     Problem:  Fall Injury Risk  Goal: Absence of Fall, Infant Drop and Related Injury  2025 by Susie Burnett RN  Outcome: Progressing  2025 by Susie Burnett, RN  Outcome: Progressing     Problem:  Wound  Goal: Optimal Coping  2025 by Susie Burnett RN  Outcome: Progressing  2025 by Susie Burnett RN  Outcome: Progressing  Goal: Optimal Functional Ability  2025 by Susie Burnett RN  Outcome: Progressing  2025 by Susie Burnett RN  Outcome: Progressing  Goal: Absence of Infection Signs and Symptoms  2025 by Susie Burnett RN  Outcome: Progressing  2025 by Susie Burnett RN  Outcome: Progressing  Goal: Improved Oral Intake  2025 by Susie Burnett RN  Outcome: Progressing  2025 by Susie Burnett RN  Outcome: Progressing  Goal: Optimal Pain Control and Function  2025 by Susie Burnett RN  Outcome: Progressing  2025 by Susie Burnett RN  Outcome: Progressing  Goal: Skin Health and Integrity  2025 by Susie Burnett RN  Outcome: Progressing  2025 by Susie Burnett RN  Outcome: Progressing  Goal: Optimal Wound Healing  2025 by Susie Burnett RN  Outcome: Progressing  2025 by Susie Burnett RN  Outcome: Progressing     Problem: Postpartum ( Delivery)  Goal: Successful Parent Role Transition  2025 by Susie Burnett RN  Outcome: Progressing  2025 by Susie Burnett RN  Outcome: Progressing  Goal: Hemostasis  2025 by Susie Burnett RN  Outcome: Progressing  2025 by Susie Burnett RN  Outcome: Progressing  Goal: Effective Bowel Elimination  2025 by Susie Burnett RN  Outcome: Progressing  2025 by Susie Burnett RN  Outcome: Progressing  Goal: Fluid and Electrolyte Balance  Outcome: Progressing  Goal: Absence of Infection Signs and Symptoms  Outcome: Progressing  Goal: Anesthesia/Sedation Recovery  Outcome: Progressing  Goal: Optimal Pain Control and Function  Outcome: Progressing  Goal: Nausea and Vomiting Relief  Outcome: Progressing  Goal: Effective Urinary Elimination  Outcome:  Progressing  Goal: Effective Oxygenation and Ventilation  Outcome: Progressing

## 2025-01-15 NOTE — ANESTHESIA POSTPROCEDURE EVALUATION
Anesthesia Post Evaluation    Patient: Renetta Martinez    Procedure(s) Performed: Procedure(s) (LRB):   SECTION (N/A)    Final Anesthesia Type: spinal      Patient location during evaluation: floor  Patient participation: Yes- Able to Participate  Level of consciousness: awake and alert and oriented  Post-procedure vital signs: reviewed and stable  Pain management: adequate  Airway patency: patent    PONV status at discharge: No PONV  Anesthetic complications: no      Cardiovascular status: blood pressure returned to baseline  Respiratory status: unassisted, spontaneous ventilation and room air  Hydration status: euvolemic  Follow-up not needed.  Comments: Denies headache, mod-severe backpain, or lower extremity motor weekness              Vitals Value Taken Time   BP 93/52 01/15/25 1216   Temp 36.7 °C (98.1 °F) 01/15/25 1216   Pulse 69 01/15/25 1216   Resp 16 01/15/25 1359   SpO2 100 % 01/15/25 1216         Event Time   Out of Recovery 14:15:00         Pain/Jessica Score: Pain Rating Prior to Med Admin: 5 (1/15/2025  1:59 PM)  Pain Rating Post Med Admin: 0 (1/15/2025  8:55 AM)  Jessica Score: 9 (2025  1:45 PM)

## 2025-01-15 NOTE — OP NOTE
[facility]                        [address] [address]    PATIENT NAME:      CARMELO SANCHEZ  YOB: 2005  CSN:               [id number]  MRN:               [id number]  ADMIT DATE:        2025 10:01:00  PHYSICIAN:         Rios Rowland Jr, MD                          OPERATIVE REPORT      DATE OF SURGERY:    2025 09:35:35    SURGEON:  Rios Rowland Jr, MD    ASSISTANT:  Elmer Rowland MD.    DESCRIPTION OF PROCEDURE:  A 19-year-old female taken to the operating room for   a primary  at 36 weeks secondary to severe growth restriction.  Spinal   anesthesia was administered and found to be adequate.  Abdomen, perineum, and   vagina were prepped and draped in the usual sterile fashion.  A Pfannenstiel   skin incision made with a knife and carried down through to the level of the   underlying fascia.  The fascial incision was then extended laterally with the   curved Sterling scissors.  Superior aspect of the rectus fascia was grasped with   Kocher clamps and dissected off the rectus muscles both superiorly and   inferiorly.  Rectus muscles  in midline.  The peritoneum was identified   and entered sharply.  The peritoneal incision then extended with both sharp and   blunt dissection.  Bladder blade was inserted.  Vesicouterine peritoneum   identified.  A bladder flap was created digitally.  Uterus was scored in a low   transverse fashion, was carried out laterally with the finger method.  Hand was   inserted into the uterus.  The infant's head was delivered without incident.    Rest of the infant was delivered in full.  Cord was clamped and cut.    Appropriate cord gases and cord blood were obtained.  Placenta was delivered   manually.  Uterus then exteriorized, cleared of clots and debris.  Hysterotomy   site was closed in 2 layers with #1 chromic.  Excellent hemostasis was noted.    Uterus returned  to the abdomen.  The gutters were irrigated and cleared of all   clots and debris.  Hemostasis was ensured throughout.  Peritoneum was run with   2-0 chromic.  Rectus muscles were reapproximated in the midline.  The fascia was   run with #1 Vicryl, subcuticular tissue with 2-0 plain gut, skin was closed   with an Insorb.  The patient tolerated the procedure well.    COUNT:  Needle, sponge, lap counts were correct x2.    BLOOD LOSS:  450.        ______________________________  MD OTONIEL De Anda Jr/AQS  DD:  01/14/2025  Time:  05:23PM  DT:  01/14/2025  Time:  10:17PM  Job #:  [id number]      OPERATIVE REPORT

## 2025-01-15 NOTE — PLAN OF CARE
Problem: Adult Inpatient Plan of Care  Goal: Plan of Care Review  2025 by Susie Burnett RN  Outcome: Progressing  2025 by Susie Burnett RN  Outcome: Progressing  Goal: Patient-Specific Goal (Individualized)  2025 by Susie Burnett RN  Flowsheets (Taken 2025)  Patient/Family-Specific Goals (Include Timeframe): be pain free by discharge  2025 by Susie Burnett RN  Outcome: Progressing  2025 by Susie Burnett RN  Outcome: Progressing  Goal: Absence of Hospital-Acquired Illness or Injury  2025 by Susie Burnett RN  Outcome: Progressing  2025 by Susie Burnett RN  Outcome: Progressing  Goal: Optimal Comfort and Wellbeing  2025 by Susie Burnett RN  Outcome: Progressing  2025 by Susie Burnett RN  Outcome: Progressing  Goal: Readiness for Transition of Care  2025 by Susie Burnett RN  Outcome: Progressing  2025 by Susie Burnett RN  Outcome: Progressing     Problem:  Delivery  Goal: Bleeding is Controlled  2025 by Susie Burnett RN  Outcome: Progressing  2025 by Susie Burnett RN  Outcome: Progressing  Goal: Stable Fetal Wellbeing  2025 by Susie Burnett RN  Outcome: Progressing  2025 by Susie Burnett RN  Outcome: Progressing  Goal: Absence of Infection Signs and Symptoms  2025 by Susie Burnett RN  Outcome: Progressing  2025 by Susie Burnett RN  Outcome: Progressing  Goal: Effective Oxygenation and Ventilation  2025 by Susie Burnett RN  Outcome: Progressing  2025 by Susie Burnett RN  Outcome: Progressing     Problem: Infection  Goal: Absence of Infection Signs and Symptoms  2025 by Susie Burnett RN  Outcome: Progressing  2025 by Carrier, Susie, RN  Outcome: Progressing     Problem:  Fall Injury Risk  Goal: Absence of Fall, Infant Drop and  Related Injury  2025 by Susie Burnett RN  Outcome: Progressing  2025 by Susie Burnett RN  Outcome: Progressing     Problem: Wound  Goal: Optimal Coping  2025 by Susie Burnett RN  Outcome: Progressing  2025 by Susie Burnett RN  Outcome: Progressing  Goal: Optimal Functional Ability  2025 by Susie Burnett RN  Outcome: Progressing  2025 by Susie Burnett RN  Outcome: Progressing  Goal: Absence of Infection Signs and Symptoms  2025 by Susie Burnett RN  Outcome: Progressing  2025 by Susie Burnett RN  Outcome: Progressing  Goal: Improved Oral Intake  2025 by Susie Burnett RN  Outcome: Progressing  2025 by Susie Burnett RN  Outcome: Progressing  Goal: Optimal Pain Control and Function  2025 by Susie Burnett RN  Outcome: Progressing  2025 by Susie Burnett RN  Outcome: Progressing  Goal: Skin Health and Integrity  2025 by Susie Burnett RN  Outcome: Progressing  2025 by Susie Burnett RN  Outcome: Progressing  Goal: Optimal Wound Healing  2025 by Susie Burnett RN  Outcome: Progressing  2025 by Susie Burnett RN  Outcome: Progressing     Problem: Postpartum ( Delivery)  Goal: Successful Parent Role Transition  2025 by Susie Burnett RN  Outcome: Progressing  2025 by Susie Burnett RN  Outcome: Progressing  Goal: Hemostasis  2025 by Susie Burnett RN  Outcome: Progressing  2025 by Susie Burnett RN  Outcome: Progressing  Goal: Effective Bowel Elimination  2025 by Susie Burnett RN  Outcome: Progressing  2025 by Susie Burnett RN  Outcome: Progressing  Goal: Fluid and Electrolyte Balance  Outcome: Progressing  Goal: Absence of Infection Signs and Symptoms  Outcome: Progressing  Goal: Anesthesia/Sedation Recovery  Outcome: Progressing  Goal: Optimal  Pain Control and Function  Outcome: Progressing  Goal: Nausea and Vomiting Relief  Outcome: Progressing  Goal: Effective Urinary Elimination  Outcome: Progressing  Goal: Effective Oxygenation and Ventilation  Outcome: Progressing

## 2025-01-15 NOTE — PROGRESS NOTES
Copied from baby's chart  .. Admit Assessment    Patient Identification  Girl Renetta Martinez   :  2025  Admit Date:  2025  Attending Provider:  Antony Cummings MD              Referral:   Pt was admitted to NICU with a diagnosis of Respiratory distress in early  period, and was admitted this hospital stay due to Respiratory distress of  [P22.9].   is involved and patient was referred to the Social Work Department via Routine NICU consult.        Verified her face sheet information:      Living Situation:      Resides at [address] [facility] [address] [place] [id number], phone: [phone]       Met with mother and her mother in postpartum room for new NICU admit social assessment. Mother was easily engaged in assessment and appropriate throughout interaction. Baby's Name: Heather Martinez. Mother did not list FOB's name and reports that she is unsure what his level of involvement will be. Mother reports to living at above confirmed address along with her mother (Ambreen Edmonds [phone]). Provided mother with LCSW contact info along with parent resource packet.       OB: Dr Rios Shearer: undecided at current     Confirmed Supplies: confirmed to having a carseat and safe sleep     History/Current Symptoms of Anxiety/Depression: denied hx of mental health dx   Discussed PPD and identifying symptoms and provided mom with PPD counseling resources and symptom brochure.       Identified Support:  mother reports sufficient support system     History/Current Substance Use:  no indications     Indications of Abuse/Neglect:   no indications        Emotional/Behavioral/Cognitive Issues: none observed at time of assessment      Current RX Prescriptions: n/a     Adequate Discharge/Visiting Transportation: yes     ALBA Signed/Filed: n/a     Qualifies:   Early steps: no  SSI: no     NICU Assessment completed in Flowsheets: yes            Plan: will follow family  throughout baby's stay in NICU for any needs         01/15/25 1304   NICU Assessment   Assessment Type Discharge Planning Assessment   Source of Information family   Verified Demographic and Insurance Information Yes   Insurance Medicaid   Lives With mother;grandmother   Name(s) of People in Home Ambreen Salas   Number people in home 3 including baby   Primary Source of Support/Comfort parent   Other children (include names and ages) n/a   Father's Involvement Limited   Is Father signing the birth certificate No   Family Involvement Moderate   Primary Contact Name and Number Renetta Martinez [phone]   Other Contacts Names and Numbers Ambreen Grey (maternal grandmother) [phone]   Infant Feeding Plan formula feeding   Does baby have crib or safe sleep space? Yes   Do you have a car seat? Yes   Resource/Environmental Concerns none   Environment Concerns none   Resources/Education Provided Medicaid transportation;Preparing for Your Baby's Discharge Home;Support Resources for NICU Families;Post Partum Depression;WIC   DME Needed Upon Discharge  none   DCFS No indications (Indicators for Report)   Discharge Plan A Home with family

## 2025-01-15 NOTE — PLAN OF CARE
Problem: Adult Inpatient Plan of Care  Goal: Plan of Care Review  Outcome: Progressing  Goal: Patient-Specific Goal (Individualized)  Outcome: Progressing  Goal: Absence of Hospital-Acquired Illness or Injury  Outcome: Progressing  Goal: Optimal Comfort and Wellbeing  Outcome: Progressing  Goal: Readiness for Transition of Care  Outcome: Progressing     Problem: Infection  Goal: Absence of Infection Signs and Symptoms  Outcome: Progressing     Problem:  Fall Injury Risk  Goal: Absence of Fall, Infant Drop and Related Injury  Outcome: Progressing     Problem: Wound  Goal: Optimal Coping  Outcome: Progressing  Goal: Optimal Functional Ability  Outcome: Progressing  Goal: Absence of Infection Signs and Symptoms  Outcome: Progressing  Goal: Improved Oral Intake  Outcome: Progressing  Goal: Optimal Pain Control and Function  Outcome: Progressing  Goal: Skin Health and Integrity  Outcome: Progressing  Goal: Optimal Wound Healing  Outcome: Progressing     Problem: Postpartum ( Delivery)  Goal: Successful Parent Role Transition  Outcome: Progressing  Goal: Hemostasis  Outcome: Progressing  Goal: Effective Bowel Elimination  Outcome: Progressing  Goal: Fluid and Electrolyte Balance  Outcome: Progressing  Goal: Absence of Infection Signs and Symptoms  Outcome: Progressing  Goal: Anesthesia/Sedation Recovery  Outcome: Progressing  Goal: Optimal Pain Control and Function  Outcome: Progressing  Goal: Nausea and Vomiting Relief  Outcome: Progressing  Goal: Effective Urinary Elimination  Outcome: Progressing  Goal: Effective Oxygenation and Ventilation  Outcome: Progressing

## 2025-01-16 PROCEDURE — 25000003 PHARM REV CODE 250: Performed by: OBSTETRICS & GYNECOLOGY

## 2025-01-16 PROCEDURE — 11000001 HC ACUTE MED/SURG PRIVATE ROOM

## 2025-01-16 RX ADMIN — IBUPROFEN 600 MG: 600 TABLET, FILM COATED ORAL at 12:01

## 2025-01-16 RX ADMIN — DOCUSATE SODIUM 200 MG: 100 CAPSULE, LIQUID FILLED ORAL at 07:01

## 2025-01-16 RX ADMIN — IBUPROFEN 600 MG: 600 TABLET, FILM COATED ORAL at 05:01

## 2025-01-16 RX ADMIN — MAGNESIUM HYDROXIDE 2400 MG: 400 SUSPENSION ORAL at 08:01

## 2025-01-16 RX ADMIN — OXYCODONE AND ACETAMINOPHEN 1 TABLET: 10; 325 TABLET ORAL at 07:01

## 2025-01-16 RX ADMIN — OXYCODONE AND ACETAMINOPHEN 1 TABLET: 10; 325 TABLET ORAL at 08:01

## 2025-01-16 RX ADMIN — OXYCODONE AND ACETAMINOPHEN 1 TABLET: 10; 325 TABLET ORAL at 02:01

## 2025-01-16 RX ADMIN — DOCUSATE SODIUM 200 MG: 100 CAPSULE, LIQUID FILLED ORAL at 08:01

## 2025-01-16 NOTE — DISCHARGE SUMMARY
[facility]                       [address] [id number]    PATIENT NAME:       CARMELO SANCHEZ  YOB: 2005  CSN:                [id number]   MRN:                [id number]  ADMIT DATE:         01/14/2025 10:01:00  PHYSICIAN:          Rios Rowland Jr, MD                          DISCHARGE SUMMARY    DATE OF DISCHARGE:      The patient is status post vaginal delivery without incident.  She was admitted   to the postpartum Gyn service where she has had an uneventful and speedy   recovery.  She is ambulating, tolerating a regular diet.  Her vitals are stable.    She is afebrile.  She had normal bowel and bladder function.  She will be   discharged home on postpartum day 2.    CONDITION ON DISCHARGE:  Stable.    DIET:  Regular.    ACTIVITY:  Pelvic rest.    MEDICATIONS:  Percocet p.r.n.. Motrin p.r.n.    FOLLOWUP:  Follow up with Dr. Rowland in 3 weeks.        ______________________________  Rios Rowland Jr, MD    DJE/AQS  DD:  01/16/2025  Time:  10:00AM  DT:  01/16/2025  Time:  10:10AM  Job #:  [id number]      DISCHARGE SUMMARY

## 2025-01-17 PROCEDURE — 11000001 HC ACUTE MED/SURG PRIVATE ROOM

## 2025-01-17 PROCEDURE — 25000003 PHARM REV CODE 250: Performed by: OBSTETRICS & GYNECOLOGY

## 2025-01-17 RX ADMIN — DOCUSATE SODIUM 200 MG: 100 CAPSULE, LIQUID FILLED ORAL at 09:01

## 2025-01-17 RX ADMIN — IBUPROFEN 600 MG: 600 TABLET, FILM COATED ORAL at 12:01

## 2025-01-17 RX ADMIN — OXYCODONE AND ACETAMINOPHEN 1 TABLET: 10; 325 TABLET ORAL at 02:01

## 2025-01-17 RX ADMIN — SIMETHICONE 80 MG: 80 TABLET, CHEWABLE ORAL at 09:01

## 2025-01-17 RX ADMIN — DOCUSATE SODIUM 200 MG: 100 CAPSULE, LIQUID FILLED ORAL at 08:01

## 2025-01-17 RX ADMIN — IBUPROFEN 600 MG: 600 TABLET, FILM COATED ORAL at 05:01

## 2025-01-17 RX ADMIN — IBUPROFEN 600 MG: 600 TABLET, FILM COATED ORAL at 11:01

## 2025-01-17 RX ADMIN — OXYCODONE AND ACETAMINOPHEN 1 TABLET: 10; 325 TABLET ORAL at 04:01

## 2025-01-17 RX ADMIN — OXYCODONE AND ACETAMINOPHEN 1 TABLET: 10; 325 TABLET ORAL at 09:01

## 2025-01-17 RX ADMIN — MAGNESIUM HYDROXIDE 2400 MG: 400 SUSPENSION ORAL at 08:01

## 2025-01-17 NOTE — PLAN OF CARE
Problem: Adult Inpatient Plan of Care  Goal: Plan of Care Review  Outcome: Progressing  Goal: Patient-Specific Goal (Individualized)  Outcome: Progressing  Goal: Absence of Hospital-Acquired Illness or Injury  Outcome: Progressing  Goal: Optimal Comfort and Wellbeing  Outcome: Progressing  Goal: Readiness for Transition of Care  Outcome: Progressing     Problem: Infection  Goal: Absence of Infection Signs and Symptoms  Outcome: Progressing     Problem:  Fall Injury Risk  Goal: Absence of Fall, Infant Drop and Related Injury  Outcome: Progressing     Problem: Wound  Goal: Optimal Coping  Outcome: Progressing  Goal: Optimal Functional Ability  Outcome: Progressing  Goal: Absence of Infection Signs and Symptoms  Outcome: Progressing  Goal: Improved Oral Intake  Outcome: Progressing  Goal: Optimal Pain Control and Function  Outcome: Progressing  Goal: Skin Health and Integrity  Outcome: Progressing  Goal: Optimal Wound Healing  Outcome: Progressing

## 2025-01-17 NOTE — PROGRESS NOTES
PostPartum Progress Note        Subjective:      Postpartum Day #3 after LTCS  .  Patient is without complaints. Lochia decreasing, less than menses.  Pain is well controlled. Patient is ambulating. Tolerating regular diet.Overall mother and baby are doing well.     Objective:      Temp:  [98.3 °F (36.8 °C)-98.7 °F (37.1 °C)] 98.3 °F (36.8 °C)  Pulse:  [83-90] 83  Resp:  [16-18] 16  SpO2:  [98 %-100 %] 100 %  BP: (121-129)/(75-80) 129/80  No intake or output data in the 24 hours ending 25 1403  Body mass index is 28.29 kg/m².    General: no acute distress  Abdomen: soft, non-tender, non-distended; Fundus firm and below the umbilicus  LTCS incision healing well   Extremities: non-tender, symmetric, mild edema    Group & Rh   Date Value Ref Range Status   2025 B POS  Final     Recent Results (from the past 2 weeks)   CBC with Differential    Collection Time: 01/15/25  4:58 AM   Result Value Ref Range    WBC 9.37 4.50 - 11.50 x10(3)/mcL    Hgb 9.5 (L) 12.0 - 16.0 g/dL    Hct 29.6 (L) 37.0 - 47.0 %    Platelet 165 130 - 400 x10(3)/mcL          Assessment:     19 y.o.  S/P , Post-Partum (post-op) Day # 3 - Doing Well      Plan:     1. Continue routine postpartum care  2. Anticipated d/c in 1 day

## 2025-01-18 VITALS
DIASTOLIC BLOOD PRESSURE: 75 MMHG | HEART RATE: 80 BPM | HEIGHT: 65 IN | RESPIRATION RATE: 17 BRPM | SYSTOLIC BLOOD PRESSURE: 124 MMHG | BODY MASS INDEX: 28.32 KG/M2 | WEIGHT: 170 LBS | TEMPERATURE: 99 F | OXYGEN SATURATION: 100 %

## 2025-01-18 PROCEDURE — 25000003 PHARM REV CODE 250: Performed by: OBSTETRICS & GYNECOLOGY

## 2025-01-18 RX ADMIN — DOCUSATE SODIUM 200 MG: 100 CAPSULE, LIQUID FILLED ORAL at 08:01

## 2025-01-18 RX ADMIN — IBUPROFEN 600 MG: 600 TABLET, FILM COATED ORAL at 05:01

## 2025-01-18 RX ADMIN — IBUPROFEN 600 MG: 600 TABLET, FILM COATED ORAL at 12:01

## 2025-01-18 RX ADMIN — MAGNESIUM HYDROXIDE 2400 MG: 400 SUSPENSION ORAL at 08:01

## 2025-01-18 NOTE — PLAN OF CARE
Problem: Adult Inpatient Plan of Care  Goal: Plan of Care Review  Outcome: Progressing  Goal: Patient-Specific Goal (Individualized)  Outcome: Progressing  Goal: Absence of Hospital-Acquired Illness or Injury  Outcome: Progressing  Goal: Optimal Comfort and Wellbeing  Outcome: Progressing  Goal: Readiness for Transition of Care  Outcome: Progressing     Problem:  Fall Injury Risk  Goal: Absence of Fall, Infant Drop and Related Injury  Outcome: Progressing     Problem: Wound  Goal: Optimal Coping  Outcome: Progressing  Goal: Optimal Functional Ability  Outcome: Progressing  Goal: Absence of Infection Signs and Symptoms  Outcome: Progressing  Goal: Improved Oral Intake  Outcome: Progressing  Goal: Optimal Pain Control and Function  Outcome: Progressing  Goal: Skin Health and Integrity  Outcome: Progressing  Goal: Optimal Wound Healing  Outcome: Progressing     Problem: Postpartum ( Delivery)  Goal: Successful Parent Role Transition  Outcome: Progressing  Goal: Hemostasis  Outcome: Progressing  Goal: Effective Bowel Elimination  Outcome: Progressing  Goal: Fluid and Electrolyte Balance  Outcome: Progressing  Goal: Absence of Infection Signs and Symptoms  Outcome: Progressing  Goal: Anesthesia/Sedation Recovery  Outcome: Progressing  Goal: Optimal Pain Control and Function  Outcome: Progressing  Goal: Nausea and Vomiting Relief  Outcome: Progressing  Goal: Effective Urinary Elimination  Outcome: Progressing  Goal: Effective Oxygenation and Ventilation  Outcome: Progressing

## 2025-01-18 NOTE — DISCHARGE SUMMARY
"Delivery Discharge Summary  Obstetrics      Primary OB Clinician: Rios Rowland Jr., MD    Discharge Provider: Dae Colunga MD    Admission date: 2025  Discharge date: 2025    Admit Dx:   Discharge Dx:    Patient Active Problem List   Diagnosis    Alpha thalassemia silent carrier    Pregnancy with poor obstetric history    History of prior pregnancy with IUGR     IUGR (intrauterine growth retardation) affecting mother, third trimester, not applicable or unspecified fetus    Anemia during pregnancy in third trimester    Delivery by  section using transverse incision of lower segment of uterus       Procedure:  section    Hospital Course:  Renetta Martinez is a 19 y.o. now  who was admitted on 2025 for delivery. Patient delivered a viable  via  section. Please see delivery note for further details. Pt was in stable condition post delivery and was transferred to the Mother-Baby Unit. Her postpartum course was uncomplicated. On the date of discharge, patient's pain is controlled with oral pain medications. She is tolerating ambulation without SOB or CP, and PO diet without N/V. Reported lochia is within the normal range. Pt in stable condition and ready for discharge.     Pertinent studies:  Postpartum CBC  Lab Results   Component Value Date    WBC 9.37 01/15/2025    HGB 9.5 (L) 01/15/2025    HCT 29.6 (L) 01/15/2025    MCV 74.9 (L) 01/15/2025     01/15/2025       Delivery:    Episiotomy:     Lacerations:     Repair suture:     Repair # of packets:     Blood loss (ml):       Birth information:  YOB: 2025   Time of birth: 12:45 PM   Sex: female   Delivery type: , Low Transverse   Gestational Age: 36w4d     Measurements    Weight: 2560 g  Weight (lbs): 5 lb 10.3 oz  Length: 47 cm  Length (in): 18.5"  Head circumference: 33.5 cm  Abdominal girth: 24 cm         Delivery Clinician: Delivery Providers    Delivering clinician: Kashif" Rios MOHAMUD Jr., MD          Additional  information:  Forceps:    Vacuum:    Breech:    Observed anomalies      Living?:     Apgars    Living status: Living  Apgar Component Scores:  1 min.:  5 min.:  10 min.:  15 min.:  20 min.:    Skin color:  0  1       Heart rate:  1  2       Reflex irritability:  1  2       Muscle tone:  1  1       Respiratory effort:  1  1       Total:  4  7       Apgars assigned by: KATHERINE MULLER RN         Placenta: Delivered:       appearance    Disposition: To home, self care    Follow Up: 2 weeks    Patient Instructions:   1. Call the office for any bleeding >2 pads/hour for >2 hours, temperature >100.4, pain that is uncontrolled with medications, or for any other concerns.  2. Pelvic rest and no tub baths x 6 weeks.  3. No driving while on narcotics.

## 2025-06-11 ENCOUNTER — HOSPITAL ENCOUNTER (EMERGENCY)
Facility: HOSPITAL | Age: 20
Discharge: HOME | End: 2025-06-11
Attending: INTERNAL MEDICINE
Payer: MEDICAID

## 2025-06-11 VITALS
OXYGEN SATURATION: 100 % | RESPIRATION RATE: 20 BRPM | HEIGHT: 65 IN | HEART RATE: 96 BPM | SYSTOLIC BLOOD PRESSURE: 129 MMHG | DIASTOLIC BLOOD PRESSURE: 88 MMHG | WEIGHT: 137.38 LBS | BODY MASS INDEX: 22.89 KG/M2 | TEMPERATURE: 99 F

## 2025-06-11 DIAGNOSIS — R10.2 VAGINAL PAIN: Primary | ICD-10-CM

## 2025-06-11 DIAGNOSIS — B96.89 BACTERIAL VAGINOSIS: ICD-10-CM

## 2025-06-11 DIAGNOSIS — N76.0 BACTERIAL VAGINOSIS: ICD-10-CM

## 2025-06-11 LAB
B-HCG UR QL: NEGATIVE
BACTERIA #/AREA URNS AUTO: ABNORMAL /HPF
BILIRUB UR QL STRIP.AUTO: ABNORMAL
CLARITY UR: ABNORMAL
CLUE CELLS VAG QL WET PREP: ABNORMAL
COLOR UR AUTO: ABNORMAL
GLUCOSE UR QL STRIP: ABNORMAL
HGB UR QL STRIP: NEGATIVE
KETONES UR QL STRIP: ABNORMAL
LEUKOCYTE ESTERASE UR QL STRIP: ABNORMAL
MUCOUS THREADS URNS QL MICRO: ABNORMAL /LPF
NITRITE UR QL STRIP: POSITIVE
PH UR STRIP: 7 [PH]
PROT UR QL STRIP: ABNORMAL
RBC #/AREA URNS AUTO: ABNORMAL /HPF
SP GR UR STRIP.AUTO: 1.01 (ref 1–1.03)
SQUAMOUS #/AREA URNS AUTO: ABNORMAL /HPF
T VAGINALIS VAG QL WET PREP: ABNORMAL
UROBILINOGEN UR STRIP-ACNC: 2
WBC #/AREA URNS AUTO: ABNORMAL /HPF
WBC #/AREA VAG WET PREP: ABNORMAL
YEAST SPEC QL WET PREP: ABNORMAL

## 2025-06-11 PROCEDURE — 63600175 PHARM REV CODE 636 W HCPCS: Mod: JZ,TB | Performed by: PHYSICIAN ASSISTANT

## 2025-06-11 PROCEDURE — 81025 URINE PREGNANCY TEST: CPT | Performed by: PHYSICIAN ASSISTANT

## 2025-06-11 PROCEDURE — 81003 URINALYSIS AUTO W/O SCOPE: CPT | Performed by: PHYSICIAN ASSISTANT

## 2025-06-11 PROCEDURE — 87210 SMEAR WET MOUNT SALINE/INK: CPT | Performed by: PHYSICIAN ASSISTANT

## 2025-06-11 PROCEDURE — 25000003 PHARM REV CODE 250: Performed by: PHYSICIAN ASSISTANT

## 2025-06-11 PROCEDURE — 99284 EMERGENCY DEPT VISIT MOD MDM: CPT | Mod: 25

## 2025-06-11 PROCEDURE — 87661 TRICHOMONAS VAGINALIS AMPLIF: CPT | Performed by: PHYSICIAN ASSISTANT

## 2025-06-11 PROCEDURE — 87591 N.GONORRHOEAE DNA AMP PROB: CPT | Performed by: PHYSICIAN ASSISTANT

## 2025-06-11 PROCEDURE — 96372 THER/PROPH/DIAG INJ SC/IM: CPT | Performed by: PHYSICIAN ASSISTANT

## 2025-06-11 RX ORDER — KETOROLAC TROMETHAMINE 30 MG/ML
30 INJECTION, SOLUTION INTRAMUSCULAR; INTRAVENOUS
Status: COMPLETED | OUTPATIENT
Start: 2025-06-11 | End: 2025-06-11

## 2025-06-11 RX ORDER — HYDROCODONE BITARTRATE AND ACETAMINOPHEN 5; 325 MG/1; MG/1
1 TABLET ORAL EVERY 6 HOURS PRN
Qty: 12 TABLET | Refills: 0 | Status: SHIPPED | OUTPATIENT
Start: 2025-06-11

## 2025-06-11 RX ORDER — METRONIDAZOLE 500 MG/1
500 TABLET ORAL EVERY 12 HOURS
Qty: 14 TABLET | Refills: 0 | Status: SHIPPED | OUTPATIENT
Start: 2025-06-11 | End: 2025-06-18

## 2025-06-11 RX ORDER — HYDROCODONE BITARTRATE AND ACETAMINOPHEN 5; 325 MG/1; MG/1
1 TABLET ORAL
Refills: 0 | Status: COMPLETED | OUTPATIENT
Start: 2025-06-11 | End: 2025-06-11

## 2025-06-11 RX ORDER — IBUPROFEN 800 MG/1
800 TABLET, FILM COATED ORAL 3 TIMES DAILY
Qty: 30 TABLET | Refills: 0 | Status: SHIPPED | OUTPATIENT
Start: 2025-06-11

## 2025-06-11 RX ADMIN — KETOROLAC TROMETHAMINE 30 MG: 30 INJECTION, SOLUTION INTRAMUSCULAR; INTRAVENOUS at 07:06

## 2025-06-11 RX ADMIN — HYDROCODONE BITARTRATE AND ACETAMINOPHEN 1 TABLET: 5; 325 TABLET ORAL at 07:06

## 2025-06-11 NOTE — ED PROVIDER NOTES
Encounter Date: 2025       History     Chief Complaint   Patient presents with    Vaginal Pain     Vaginal pain after intercourse on Saturday. States she was seen at urgent care yesterday and prescribed medications but not having any relief.      See [facility] for details.      The history is provided by the patient. No  was used.     Review of patient's allergies indicates:  No Known Allergies  Past Medical History:   Diagnosis Date    Folliculitis 2022     Past Surgical History:   Procedure Laterality Date     SECTION       SECTION N/A 2025    Procedure:  SECTION;  Surgeon: Rios Rowland Jr., MD;  Location: [facility] L&D;  Service: OB/GYN;  Laterality: N/A;     Family History   Problem Relation Name Age of Onset    Hypertension Mother       Social History[1]  Review of Systems   Constitutional: Negative.    HENT: Negative.     Eyes: Negative.    Respiratory: Negative.     Cardiovascular: Negative.    Gastrointestinal: Negative.    Genitourinary:  Positive for vaginal pain.   Musculoskeletal: Negative.    Neurological: Negative.    All other systems reviewed and are negative.      Physical Exam     Initial Vitals [25 1823]   BP Pulse Resp Temp SpO2   129/83 84 20 98.6 °F (37 °C) 99 %      MAP       --         Physical Exam    Nursing note and vitals reviewed.  Constitutional: She appears well-developed and well-nourished. She is not diaphoretic. No distress.   HENT:   Head: Atraumatic.   Eyes: Lids are normal.   Cardiovascular:  Normal rate.           Pulmonary/Chest: Effort normal. No respiratory distress.   Abdominal: Abdomen is flat. There is no abdominal tenderness. There is no rebound, no guarding and negative Gaspar's sign.   Genitourinary:    Pelvic exam was performed with patient prone.   There is tenderness on the right labia. There is no injury on the right labia. There is tenderness on the left labia. There is no injury on the left labia.     Genitourinary Comments: Thick orange tinged vaginal discharge noted. Agus Tatum chaperone present for entire exam.       Neurological: She is alert and oriented to person, place, and time. She has normal strength. She is not disoriented. GCS eye subscore is 4. GCS verbal subscore is 5. GCS motor subscore is 6.   Skin: Skin is warm and intact.   Psychiatric: She has a normal mood and affect. Her speech is normal and behavior is normal. Judgment and thought content normal. Cognition and memory are normal.         ED Course   Procedures  Labs Reviewed   WET PREP, GENITAL - Abnormal       Result Value    WBC, Wet Prep Few (*)     Clue Cells, Wet Prep Moderate (*)     Trichomonas, Wet Prep None Seen      Yeast, Wet Prep None Seen     URINALYSIS, REFLEX TO URINE CULTURE - Abnormal    Color, UA Orange (*)     Appearance, UA Slightly Cloudy (*)     Specific Gravity, UA 1.015      pH, UA 7.0      Protein, UA 1+ (*)     Glucose, UA Trace (*)     Ketones, UA Trace (*)     Blood, UA Negative      Bilirubin, UA 1+ (*)     Urobilinogen, UA 2.0 (*)     Nitrites, UA Positive (*)     Leukocyte Esterase, UA 3+ (*)    URINALYSIS, MICROSCOPIC - Abnormal    Bacteria, UA Rare      Mucous, UA Trace (*)     RBC, UA 0-2      WBC, UA 6-10 (*)     Squamous Epithelial Cells, UA Moderate (*)    PREGNANCY TEST, URINE RAPID - Normal    hCG Qualitative, Urine Negative     CHLAMYDIA/GONORRHOEAE(GC), PCR   TRICHOMONAS VAGINALIS, RNA,QUAL          Imaging Results    None          Medications   HYDROcodone-acetaminophen 5-325 mg per tablet 1 tablet (1 tablet Oral Given 6/11/25 1904)   ketorolac injection 30 mg (30 mg Intramuscular Given 6/11/25 1904)     Medical Decision Making  20yoAAF w/hx of folliculitis presents to the emergency department with vaginal pain after having intercourse approximately 5 days ago.  Patient denies other injury.  Denies any abnormal vaginal discharge.  She went to urgent care this week.  And was given antibiotics and  Pyridium for a UTI.  Patient denies nausea, vomiting, fever, chills, chest pain, or abdominal pain.  Patient does not have any other symptoms.  States ibuprofen is not improving her pain.    Problems Addressed:  Vaginal pain: acute illness or injury     Details: Differential diagnosis included but not limited to:  Vaginitis, vaginal fissure, intravaginal fissure, chlamydia, gonorrhea, yeast infection, other etiology    Her vaginal discharge was orange, but this is likely due to the Pyridium that she is taking.Patient likely has a small on her vaginal fissure. No abnormalities on manual exam.  This compounded with intravaginal infection causing increased pain.  She denies any abdominal pain her pelvic pain.  She does not have any abdominal pain on exam.  Her wet prep was positive for bacterial vaginosis.  I recommend Flagyl and pelvic rest until her pain resolves.  She needs to see her OBGYN as well before having any intercourse or using any tampons.  Patient agreeable to plan.  No acute distress prior to discharge.            Amount and/or Complexity of Data Reviewed  Labs: ordered. Decision-making details documented in ED Course.    Risk  OTC drugs.  Prescription drug management.               ED Course as of 06/11/25 2052 Wed Jun 11, 2025   1844 Protein, UA(!): 1+ [ST]   1844 Glucose, UA(!): Trace [ST]   1844 Ketones, UA(!): Trace [ST]   1844 Appearance, UA(!): Slightly Cloudy [ST]   1844 Color, UA(!): Orange [ST]   1844 NITRITE UA(!): Positive [ST]   1844 Urobilinogen, UA(!): 2.0 [ST]   1844 Bilirubin (UA)(!): 1+ [ST]   1844 Leukocyte Esterase, UA(!): 3+  Taking macrobid and pyridium [ST]   1845 hCG Qualitative, Urine: Negative [ST]   1905 WBC - Vaginal Screen(!): Few [ST]   1906 Clue Cells, Wet Prep(!): Moderate [ST]   1906 WBC, UA(!): 6-10 [ST]      ED Course User Index  [ST] Dionne Rich PA                           Clinical Impression:  Final diagnoses:  [R10.2] Vaginal pain (Primary)  [N76.0,  B96.89] Bacterial vaginosis          ED Disposition Condition    Discharge Stable          ED Prescriptions       Medication Sig Dispense Start Date End Date Auth. Provider    metroNIDAZOLE (FLAGYL) 500 MG tablet Take 1 tablet (500 mg total) by mouth every 12 (twelve) hours. for 7 days 14 tablet 2025 Dionne Rich PA    HYDROcodone-acetaminophen (NORCO) 5-325 mg per tablet Take 1 tablet by mouth every 6 (six) hours as needed for Pain. 12 tablet 2025 -- Dionne Rich PA    ibuprofen (ADVIL,MOTRIN) 800 MG tablet Take 1 tablet (800 mg total) by mouth 3 (three) times daily. 30 tablet 2025 -- Dionne Rich PA    pramoxine 1 % Towl Apply 1 Application topically once daily. for 10 days 12 each 2025 Dionne Rich PA          Follow-up Information       Follow up With Specialties Details Why Contact Info    [facility] - Emergency Dept Emergency Medicine  As needed, If symptoms worsen [address] [facility] [id number] [phone]    Nikki Hidalgo MD Family Medicine Schedule an appointment as soon as possible for a visit   [address]  [address]  [place] [address]  [phone]              This note was typed partially using voice recognition software.  Please be reminded that not all corrections/addendums to grammar may have been made prior to closing of this chart.         [1]   Social History  Tobacco Use    Smoking status: Former     Types: Vaping with nicotine     Start date:      Quit date:      Years since quittin.4     Passive exposure: Never    Smokeless tobacco: Never   Substance Use Topics    Alcohol use: Yes     Comment: occasion    Drug use: Never        Dionne Rich PA  25

## 2025-06-12 LAB
C TRACH DNA SPEC QL NAA+PROBE: NOT DETECTED
N GONORRHOEA DNA SPEC QL NAA+PROBE: NOT DETECTED
SPECIMEN SOURCE: NORMAL

## 2025-06-13 LAB
SPECIMEN SOURCE: NORMAL
T VAGINALIS RRNA SPEC QL NAA+PROBE: NEGATIVE

## 2025-07-09 ENCOUNTER — HOSPITAL ENCOUNTER (EMERGENCY)
Facility: HOSPITAL | Age: 20
Discharge: HOME | End: 2025-07-09
Attending: EMERGENCY MEDICINE
Payer: MEDICAID

## 2025-07-09 VITALS
BODY MASS INDEX: 22.49 KG/M2 | WEIGHT: 135 LBS | SYSTOLIC BLOOD PRESSURE: 135 MMHG | HEIGHT: 65 IN | OXYGEN SATURATION: 100 % | TEMPERATURE: 98 F | DIASTOLIC BLOOD PRESSURE: 75 MMHG | RESPIRATION RATE: 18 BRPM | HEART RATE: 79 BPM

## 2025-07-09 DIAGNOSIS — N89.8 VAGINAL DISCHARGE: Primary | ICD-10-CM

## 2025-07-09 DIAGNOSIS — R10.30 LOWER ABDOMINAL PAIN: ICD-10-CM

## 2025-07-09 LAB
ANION GAP SERPL CALC-SCNC: 6 MEQ/L
B-HCG UR QL: NEGATIVE
BASOPHILS # BLD AUTO: 0.03 X10(3)/MCL
BASOPHILS NFR BLD AUTO: 0.5 %
BILIRUB UR QL STRIP.AUTO: NEGATIVE
BUN SERPL-MCNC: 10 MG/DL (ref 7–18.7)
C TRACH DNA SPEC QL NAA+PROBE: NOT DETECTED
CALCIUM SERPL-MCNC: 9.5 MG/DL (ref 8.4–10.2)
CHLORIDE SERPL-SCNC: 109 MMOL/L (ref 98–107)
CLARITY UR: CLEAR
CLUE CELLS VAG QL WET PREP: ABNORMAL
CO2 SERPL-SCNC: 27 MMOL/L (ref 22–29)
COLOR UR AUTO: YELLOW
CREAT SERPL-MCNC: 0.77 MG/DL (ref 0.55–1.02)
CREAT/UREA NIT SERPL: 13
EOSINOPHIL # BLD AUTO: 0.05 X10(3)/MCL (ref 0–0.9)
EOSINOPHIL NFR BLD AUTO: 0.9 %
ERYTHROCYTE [DISTWIDTH] IN BLOOD BY AUTOMATED COUNT: 15.1 % (ref 11.5–17)
GFR SERPLBLD CREATININE-BSD FMLA CKD-EPI: >60 ML/MIN/1.73/M2
GLUCOSE SERPL-MCNC: 79 MG/DL (ref 74–100)
GLUCOSE UR QL STRIP: NEGATIVE
HCT VFR BLD AUTO: 38.4 % (ref 37–47)
HGB BLD-MCNC: 11.9 G/DL (ref 12–16)
HGB UR QL STRIP: NEGATIVE
IMM GRANULOCYTES # BLD AUTO: 0 X10(3)/MCL (ref 0–0.04)
IMM GRANULOCYTES NFR BLD AUTO: 0 %
KETONES UR QL STRIP: NEGATIVE
LEUKOCYTE ESTERASE UR QL STRIP: NEGATIVE
LYMPHOCYTES # BLD AUTO: 2.2 X10(3)/MCL (ref 0.6–4.6)
LYMPHOCYTES NFR BLD AUTO: 39.8 %
MCH RBC QN AUTO: 23.1 PG (ref 27–31)
MCHC RBC AUTO-ENTMCNC: 31 G/DL (ref 33–36)
MCV RBC AUTO: 74.6 FL (ref 80–94)
MONOCYTES # BLD AUTO: 0.21 X10(3)/MCL (ref 0.1–1.3)
MONOCYTES NFR BLD AUTO: 3.8 %
N GONORRHOEA DNA SPEC QL NAA+PROBE: NOT DETECTED
NEUTROPHILS # BLD AUTO: 3.04 X10(3)/MCL (ref 2.1–9.2)
NEUTROPHILS NFR BLD AUTO: 55 %
NITRITE UR QL STRIP: NEGATIVE
PH UR STRIP: 8 [PH]
PLATELET # BLD AUTO: 219 X10(3)/MCL (ref 130–400)
PMV BLD AUTO: 10.8 FL (ref 7.4–10.4)
POTASSIUM SERPL-SCNC: 3.6 MMOL/L (ref 3.5–5.1)
PROT UR QL STRIP: NEGATIVE
RBC # BLD AUTO: 5.15 X10(6)/MCL (ref 4.2–5.4)
SODIUM SERPL-SCNC: 142 MMOL/L (ref 136–145)
SP GR UR STRIP.AUTO: 1.01 (ref 1–1.03)
SPECIMEN SOURCE: NORMAL
T VAGINALIS VAG QL WET PREP: ABNORMAL
UROBILINOGEN UR STRIP-ACNC: 0.2
WBC # BLD AUTO: 5.53 X10(3)/MCL (ref 4.5–11.5)
WBC #/AREA VAG WET PREP: ABNORMAL
YEAST SPEC QL WET PREP: ABNORMAL

## 2025-07-09 PROCEDURE — 25500020 PHARM REV CODE 255: Performed by: EMERGENCY MEDICINE

## 2025-07-09 PROCEDURE — 85025 COMPLETE CBC W/AUTO DIFF WBC: CPT | Performed by: EMERGENCY MEDICINE

## 2025-07-09 PROCEDURE — 81003 URINALYSIS AUTO W/O SCOPE: CPT | Performed by: EMERGENCY MEDICINE

## 2025-07-09 PROCEDURE — 25000003 PHARM REV CODE 250: Performed by: EMERGENCY MEDICINE

## 2025-07-09 PROCEDURE — 81025 URINE PREGNANCY TEST: CPT | Performed by: EMERGENCY MEDICINE

## 2025-07-09 PROCEDURE — 99285 EMERGENCY DEPT VISIT HI MDM: CPT | Mod: 25

## 2025-07-09 PROCEDURE — 87210 SMEAR WET MOUNT SALINE/INK: CPT | Performed by: EMERGENCY MEDICINE

## 2025-07-09 PROCEDURE — 80048 BASIC METABOLIC PNL TOTAL CA: CPT | Performed by: EMERGENCY MEDICINE

## 2025-07-09 PROCEDURE — 87591 N.GONORRHOEAE DNA AMP PROB: CPT | Performed by: EMERGENCY MEDICINE

## 2025-07-09 PROCEDURE — 96360 HYDRATION IV INFUSION INIT: CPT

## 2025-07-09 RX ORDER — IOPAMIDOL 755 MG/ML
75 INJECTION, SOLUTION INTRAVASCULAR
Status: COMPLETED | OUTPATIENT
Start: 2025-07-09 | End: 2025-07-09

## 2025-07-09 RX ADMIN — IOPAMIDOL 75 ML: 755 INJECTION, SOLUTION INTRAVENOUS at 01:07

## 2025-07-09 RX ADMIN — SODIUM CHLORIDE 500 ML: 9 INJECTION, SOLUTION INTRAVENOUS at 12:07

## 2025-07-09 NOTE — ED PROVIDER NOTES
ED PROVIDER NOTE  2025    CHIEF COMPLAINT:   Chief Complaint   Patient presents with    Vaginal Discharge     C/o vaginal discharge and abdominal pain that started on monday       HISTORY OF PRESENT ILLNESS:   Renetta Martinez is a 20 y.o. female who presents with chief complaint Vaginal discharge.  Patient reports that 2 days ago she began having white fish she smelling vaginal discharge along with some lower abdominal and pelvic cramping.  States she is not sure if she has an STD or if she is pregnant.  States she has been off birth control for a while but his back on it now.  Denies fever, nausea, vomiting, diarrhea.    The history is provided by the patient.         REVIEW OF SYSTEMS: as noted in the HPI.  NURSING NOTES REVIEWED      PAST MEDICAL/SURGICAL HISTORY:   Past Medical History:   Diagnosis Date    Folliculitis 2022      Past Surgical History:   Procedure Laterality Date     SECTION       SECTION N/A 2025    Procedure:  SECTION;  Surgeon: Rios Rowland Jr., MD;  [facility]  Service: OB/GYN;  Laterality: N/A;       FAMILY HISTORY:   Family History   Problem Relation Name Age of Onset    Hypertension Mother         SOCIAL HISTORY: Social History[1]    ALLERGIES: Review of patient's allergies indicates:  No Known Allergies    PHYSICAL EXAM:  Initial Vitals [25 1128]   BP Pulse Resp Temp SpO2   135/75 79 18 98.2 °F (36.8 °C) 100 %      MAP       --         Physical Exam    Nursing note and vitals reviewed.  Constitutional: She appears well-developed and well-nourished.   HENT:   Head: Normocephalic and atraumatic. Mouth/Throat: Uvula is midline and mucous membranes are normal.   Eyes: EOM are normal. Pupils are equal, round, and reactive to light.   Neck: Trachea normal. Neck supple.   Cardiovascular:  Normal rate, regular rhythm and normal pulses.           Pulmonary/Chest: Effort normal and breath sounds normal.   Abdominal: Abdomen is soft. Bowel  sounds are normal. There is no abdominal tenderness. There is no rebound and no guarding.   Genitourinary:    Genitourinary Comments: Patient declined pelvic exam at this time.     Musculoskeletal:         General: Normal range of motion.      Cervical back: Neck supple.     Neurological: She is alert and oriented to person, place, and time. GCS eye subscore is 4. GCS verbal subscore is 5. GCS motor subscore is 6.   Skin: Skin is warm and dry.         RESULTS:  Labs Reviewed   WET PREP, GENITAL - Abnormal       Result Value    WBC, Wet Prep Rare (*)     Clue Cells, Wet Prep None Seen      Trichomonas, Wet Prep None Seen      Yeast, Wet Prep None Seen     BASIC METABOLIC PANEL - Abnormal    Sodium 142      Potassium 3.6      Chloride 109 (*)     CO2 27      Glucose 79      Blood Urea Nitrogen 10.0      Creatinine 0.77      BUN/Creatinine Ratio 13      Calcium 9.5      Anion Gap 6.0      eGFR >60     CBC WITH DIFFERENTIAL - Abnormal    WBC 5.53      RBC 5.15      Hgb 11.9 (*)     Hct 38.4      MCV 74.6 (*)     MCH 23.1 (*)     MCHC 31.0 (*)     RDW 15.1      Platelet 219      MPV 10.8 (*)     Neut % 55.0      Lymph % 39.8      Mono % 3.8      Eos % 0.9      Basophil % 0.5      Imm Grans % 0.0      Neut # 3.04      Lymph # 2.20      Mono # 0.21      Eos # 0.05      Baso # 0.03      Imm Gran # 0.00     PREGNANCY TEST, URINE RAPID - Normal    hCG Qualitative, Urine Negative     URINALYSIS, REFLEX TO URINE CULTURE - Normal    Color, UA Yellow      Appearance, UA Clear      Specific Gravity, UA 1.015      pH, UA 8.0      Protein, UA Negative      Glucose, UA Negative      Ketones, UA Negative      Blood, UA Negative      Bilirubin, UA Negative      Urobilinogen, UA 0.2      Nitrites, UA Negative      Leukocyte Esterase, UA Negative     CHLAMYDIA/GONORRHOEAE(GC), PCR   CBC W/ AUTO DIFFERENTIAL    Narrative:     The following orders were created for panel order CBC auto differential.  Procedure                                Abnormality         Status                     ---------                               -----------         ------                     CBC with Differential[[id number]]       Abnormal            Final result                 Please view results for these tests on the individual orders.     Imaging Results              CT Abdomen Pelvis With IV Contrast NO Oral Contrast (Final result)  Result time 07/09/25 14:35:09      Final result by Ramesh Delgado MD (07/09/25 14:35:09)                   Impression:      1. Prominent low-attenuation wedge-shaped focus at the anterior liver which is larger than the typical focus of low-attenuation/hypoperfusion seen in this region. Underlying pathology cannot be excluded.  Follow-up MR examination is recommended for further evaluation.  2. Atypical ill-defined low-attenuation foci in the kidneys bilaterally measuring up to 2 cm in greatest diameter with the left side greater than the right of uncertain etiology.  This may represent a infectious/inflammatory process/pyelonephritis.  No significant perinephric stranding is seen.  A nonobstructing stone is noted to the lower left kidney measuring 2-3 mm.  3. Ill-defined soft tissue fullness at the cervical vaginal region with the small amount of free fluid in the posterior lower pelvis as well as small amount of gas in the vaginal cavity.  Clinical correlation is indicated  4. The appendix is not identified with certainty.  Evaluation is limited secondary to multiple unopacified loops of bowel in the right lower abdomen and paucity of intraperitoneal fat.  A repeat exam with oral contrast would allow further evaluation if clinically indicated  5. Findings and other details as above      Electronically signed by: Ramesh Delgado  Date:    07/09/2025  Time:    14:35               Narrative:    EXAMINATION:  CT ABDOMEN PELVIS WITH IV CONTRAST    CLINICAL HISTORY:  RLQ abdominal pain (Age >= 14y);, .    TECHNIQUE:  PATIENT RADIATION DOSE:  DLP(mGycm) : 295    As per PQRS measures, all CT scans at this facility used dose modulation, iterative reconstruction, and/or weight based dose adjustment when appropriate to reduce radiation dose to as low as reasonably achievable.    COMPARISON:  None available    FINDINGS:  Serial axial images were obtained through the abdomen and pelvis with the administration of  IV contrast. Coronal and sagittal reconstructions where also obtained. Bony structures are grossly intact.  The heart is normal in size.  The lung bases are relatively clear.  Wedge-shaped 4.4 x 4.1 x 3.5 cm low-attenuation focus evident at the anterior liver larger in size than typical low-attenuation/hypoperfusion wedge commonly seen in this region.  The spleen, adrenal glands, gallbladder and pancreas are grossly within normal limits.  A few small lymph nodes are seen within the periaortic and pericaval region.  The kidneys are relatively symmetric in size.  No hydronephrosis is seen.  A few ill-defined low-attenuation foci are seen within the kidneys bilaterally with the larger on the left measuring up to 2 cm in diameter there is a paucity of intraperitoneal fat.  No dilated loops of bowel are identified.  Gas and feces are seen within the colon.  A a small shallow umbilical hernia/rectus diastasis is identified.  The appendix is not identified with certainty.  Evaluation is limited secondary to multiple unopacified loops of bowel in the right lower abdomen.  The uterus is grossly normal in size.  There is ill-defined soft tissue fullness at the cervical vaginal region with small amount of free fluid in the posterior cul-de-sac.  Small amount of gas present within the vaginal cavity.  The bladder is distended with fluid.                                      PROCEDURES:  Procedures    ECG:       ED COURSE AND MEDICAL DECISION MAKING:  Medications   sodium chloride 0.9% bolus 500 mL 500 mL (0 mLs Intravenous Stopped 7/9/25 1324)   iopamidoL  (ISOVUE-370) injection 75 mL (75 mLs Intravenous Given 7/9/25 1342)     ED Course as of 07/09/25 1441   Wed Jul 09, 2025   1146 WBC - Vaginal Screen(!): Rare [IB]   1146 Clue Cells, Wet Prep: None Seen [IB]   1146 Trichomonas, Wet Prep: None Seen [IB]   1146 Yeast, Wet Prep: None Seen [IB]   1149 WBC: 5.53 [IB]   1149 Hemoglobin(!): 11.9 [IB]   1149 Platelet Count: 219 [IB]   1214 Creatinine: 0.77 [IB]   1312 hCG Qualitative, Urine: Negative [IB]   1313 NITRITE UA: Negative [IB]   1313 Leukocyte Esterase, UA: Negative [IB]      ED Course User Index  [IB] Michele Westfall, DO        Medical Decision Making  This patient presents with abdominal pain of unclear etiology. A CT scan was performed to evaluate for potential causes of the abdominal pain, however, neither the clinical exam nor the CT has identified an emergent etiology for the abdominal pain. Specifically, given the benign exam, the laboratory studies, and unremarkable CT, I have a very low suspicion for appendicitis, ischemic bowel, bowel perforation, or any other life threatening disease. I have discussed with the patient the level of uncertainty with undifferentiated abdominal pain and clearly explained the need to follow-up as noted on the discharge instructions, or return to the Emergency Department immediately if the pain worsens, develops fever, persistent and uncontrollable vomiting, or for any new symptoms or concerns.  Although the radiologist was not able to visualize the appendix, given her benign exam without any tenderness or guarding I do not feel that any repeat imaging is needed at this time as I have low suspicion for appendicitis based on history and physical exam.  I did offer to perform a pelvic exam but patient declined and states that she will just follow up with her OBGYN.  She will also follow up with her my chart for her GC chlamydia results and we will come back and be treated if positive.  Discussed abnormality seen on liver and  kidneys which we will need further outpatient workup.  UA does not show any evidence of infection in her labs are otherwise unremarkable.  Given strict ED return precautions. I have spoken with the patient and/or caregivers. I have explained the patient's condition, diagnoses and treatment plan based on the information available to me at this time. I have answered the patient's and/or caregiver's questions and addressed any concerns. The patient and/or caregivers have as good an understanding of the patient's diagnosis, condition and treatment plan as can be expected at this point. The vital signs have been stable. The patient's condition is stable and appropriate for discharge from the emergency department.     The patient will pursue further outpatient evaluation with the primary care physician or other designated or consulting physician as outlined in the discharge instructions. The patient and/or caregivers are agreeable to this plan of care and follow-up instructions have been explained in detail. The patient and/or caregivers have received these instructions in written format and have expressed an understanding of the discharge instructions. The patient and/or caregivers are aware that any significant change in condition or worsening of symptoms should prompt an immediate return to this or the closest emergency department or a call to 911.    Amount and/or Complexity of Data Reviewed  Labs: ordered. Decision-making details documented in ED Course.  Radiology: ordered.    Risk  OTC drugs.  Prescription drug management.        CLINICAL IMPRESSION:  1. Vaginal discharge    2. Lower abdominal pain        DISPOSITION:   ED Disposition Condition    Discharge Stable            ED Prescriptions    None       Follow-up Information       Follow up With Specialties Details Why Contact Info    Nikki Hidalgo MD Family Medicine Schedule an appointment as soon as possible for a visit   [address] [facility]  [place] [address]  [phone]      [facility] - Emergency Dept Emergency Medicine  If symptoms worsen [address] [place] [address]  [phone]                   [1]   Social History  Tobacco Use    Smoking status: Former     Types: Vaping with nicotine     Start date:      Quit date:      Years since quittin.5     Passive exposure: Never    Smokeless tobacco: Never   Substance Use Topics    Alcohol use: Yes     Comment: occasion    Drug use: Never        Michele Westfall DO  25 1441

## 2025-07-22 DIAGNOSIS — K76.89 BENIGN LIVER CYST: Primary | ICD-10-CM

## 2025-07-31 ENCOUNTER — HOSPITAL ENCOUNTER (OUTPATIENT)
Dept: RADIOLOGY | Facility: HOSPITAL | Age: 20
Discharge: HOME | End: 2025-07-31
Attending: FAMILY MEDICINE
Payer: MEDICAID

## 2025-07-31 DIAGNOSIS — K76.89 BENIGN LIVER CYST: ICD-10-CM

## 2025-07-31 PROCEDURE — 25500020 PHARM REV CODE 255: Performed by: FAMILY MEDICINE

## 2025-07-31 PROCEDURE — A9577 INJ MULTIHANCE: HCPCS | Performed by: FAMILY MEDICINE

## 2025-07-31 PROCEDURE — 74183 MRI ABD W/O CNTR FLWD CNTR: CPT | Mod: TC

## 2025-07-31 RX ADMIN — GADOBENATE DIMEGLUMINE 10 ML: 529 INJECTION, SOLUTION INTRAVENOUS at 10:07

## (undated) DEVICE — SOL NACL IRR 1000ML BTL

## (undated) DEVICE — SUT 2/0 27IN PLAIN GUT CT

## (undated) DEVICE — ELECTRODE REM PLYHSV RETURN 9

## (undated) DEVICE — SYS CLSR DERMABOND PRINEO 22CM

## (undated) DEVICE — PENCIL SMK EVAC CONNECTOR 10FT

## (undated) DEVICE — SOL WATER STRL IRR 1000ML

## (undated) DEVICE — SUT 3/0 36IN COATED VICRYL

## (undated) DEVICE — BINDER ABDOM 4PANEL 12IN LG/XL

## (undated) DEVICE — PAD SANITARY OB STERILE

## (undated) DEVICE — SUT CHROMIC GUT 2-0 CT-1 27IN

## (undated) DEVICE — SUT MONOCRYL 4-0 PS-1 UND

## (undated) DEVICE — SUT 1 36IN CHROMIC GUT CTX

## (undated) DEVICE — SUT VICRYL 3-0 OB 36 CT-1

## (undated) DEVICE — STAPLER SKIN SUBCUTICULAR

## (undated) DEVICE — SET FLUID WARMER RANGER

## (undated) DEVICE — PAD UNDERPAD 30X30

## (undated) DEVICE — SEE MEDLINE ITEM 156931

## (undated) DEVICE — TRAY CATH 1-LYR URIMTR 16FR

## (undated) DEVICE — DRESSING WND GZ 10X4X8X2IN

## (undated) DEVICE — BULB SYRINGE EAR IRRIGATION

## (undated) DEVICE — SEE MEDLINE ITEM 157117

## (undated) DEVICE — CAP BABY BEANIE